# Patient Record
Sex: FEMALE | Race: BLACK OR AFRICAN AMERICAN | NOT HISPANIC OR LATINO | Employment: OTHER | URBAN - METROPOLITAN AREA
[De-identification: names, ages, dates, MRNs, and addresses within clinical notes are randomized per-mention and may not be internally consistent; named-entity substitution may affect disease eponyms.]

---

## 2018-03-07 ENCOUNTER — OFFICE VISIT (OUTPATIENT)
Dept: INTERNAL MEDICINE | Facility: CLINIC | Age: 78
End: 2018-03-07
Attending: FAMILY MEDICINE
Payer: MEDICARE

## 2018-03-07 ENCOUNTER — TELEPHONE (OUTPATIENT)
Dept: INTERNAL MEDICINE | Facility: CLINIC | Age: 78
End: 2018-03-07

## 2018-03-07 VITALS
BODY MASS INDEX: 40.04 KG/M2 | HEIGHT: 60 IN | SYSTOLIC BLOOD PRESSURE: 122 MMHG | WEIGHT: 203.94 LBS | OXYGEN SATURATION: 99 % | HEART RATE: 74 BPM | DIASTOLIC BLOOD PRESSURE: 70 MMHG

## 2018-03-07 DIAGNOSIS — H40.9 GLAUCOMA, UNSPECIFIED GLAUCOMA TYPE, UNSPECIFIED LATERALITY: ICD-10-CM

## 2018-03-07 DIAGNOSIS — E66.01 MORBID OBESITY: ICD-10-CM

## 2018-03-07 DIAGNOSIS — E53.8 B12 DEFICIENCY: ICD-10-CM

## 2018-03-07 DIAGNOSIS — F03.90 DEMENTIA WITHOUT BEHAVIORAL DISTURBANCE, UNSPECIFIED DEMENTIA TYPE: ICD-10-CM

## 2018-03-07 DIAGNOSIS — M81.0 OSTEOPOROSIS, UNSPECIFIED OSTEOPOROSIS TYPE, UNSPECIFIED PATHOLOGICAL FRACTURE PRESENCE: ICD-10-CM

## 2018-03-07 DIAGNOSIS — I50.43 ACUTE ON CHRONIC COMBINED SYSTOLIC AND DIASTOLIC HEART FAILURE: ICD-10-CM

## 2018-03-07 DIAGNOSIS — E78.5 HYPERLIPIDEMIA, UNSPECIFIED HYPERLIPIDEMIA TYPE: ICD-10-CM

## 2018-03-07 DIAGNOSIS — I34.0 MITRAL VALVE INSUFFICIENCY, UNSPECIFIED ETIOLOGY: Primary | ICD-10-CM

## 2018-03-07 DIAGNOSIS — G47.33 OSA (OBSTRUCTIVE SLEEP APNEA): ICD-10-CM

## 2018-03-07 DIAGNOSIS — E55.9 VITAMIN D DEFICIENCY: ICD-10-CM

## 2018-03-07 PROCEDURE — 99999 PR PBB SHADOW E&M-NEW PATIENT-LVL IV: CPT | Mod: PBBFAC,,, | Performed by: FAMILY MEDICINE

## 2018-03-07 PROCEDURE — 99203 OFFICE O/P NEW LOW 30 MIN: CPT | Mod: S$PBB,,, | Performed by: FAMILY MEDICINE

## 2018-03-07 PROCEDURE — 99204 OFFICE O/P NEW MOD 45 MIN: CPT | Mod: PBBFAC | Performed by: FAMILY MEDICINE

## 2018-03-07 RX ORDER — ENALAPRIL MALEATE 2.5 MG/1
2.5 TABLET ORAL DAILY
Qty: 90 TABLET | Refills: 3 | Status: SHIPPED | OUTPATIENT
Start: 2018-03-07 | End: 2019-02-19 | Stop reason: SDUPTHER

## 2018-03-07 RX ORDER — ERGOCALCIFEROL 1.25 MG/1
50000 CAPSULE ORAL
COMMUNITY
Start: 2018-02-22 | End: 2018-03-07 | Stop reason: SDUPTHER

## 2018-03-07 RX ORDER — DONEPEZIL HYDROCHLORIDE 5 MG/1
5 TABLET, FILM COATED ORAL DAILY
Qty: 90 TABLET | Refills: 3 | Status: SHIPPED | OUTPATIENT
Start: 2018-03-07 | End: 2019-02-19 | Stop reason: SDUPTHER

## 2018-03-07 RX ORDER — FUROSEMIDE 20 MG/1
TABLET ORAL
COMMUNITY
Start: 2018-02-22 | End: 2018-03-07 | Stop reason: SDUPTHER

## 2018-03-07 RX ORDER — DONEPEZIL HYDROCHLORIDE 5 MG/1
5 TABLET, FILM COATED ORAL
COMMUNITY
Start: 2018-02-22 | End: 2018-03-07 | Stop reason: SDUPTHER

## 2018-03-07 RX ORDER — ENALAPRIL MALEATE 2.5 MG/1
2.5 TABLET ORAL
COMMUNITY
Start: 2018-02-22 | End: 2018-03-07 | Stop reason: SDUPTHER

## 2018-03-07 RX ORDER — FUROSEMIDE 20 MG/1
20 TABLET ORAL 2 TIMES DAILY
Qty: 60 TABLET | Refills: 2 | Status: SHIPPED | OUTPATIENT
Start: 2018-03-07 | End: 2018-10-01 | Stop reason: SDUPTHER

## 2018-03-07 RX ORDER — ERGOCALCIFEROL 1.25 MG/1
50000 CAPSULE ORAL
Qty: 12 CAPSULE | Refills: 3 | Status: SHIPPED | OUTPATIENT
Start: 2018-03-07 | End: 2019-02-19 | Stop reason: SDUPTHER

## 2018-03-07 NOTE — PROGRESS NOTES
Subjective:      Patient ID: Cecelia Currie is a 77 y.o. female.    Chief Complaint: Establish Care    She typically gets her care in [place] and last pcp visit was 9 months ago. She did start take vitamin D medication. 2 weeks ago stopped all medication just because. She lives with her daughter. She reports her mood is good, no SI or HI.       Review of Systems   Constitutional: Negative.    HENT: Negative.    Eyes: Negative.    Cardiovascular: Negative.    Gastrointestinal: Negative.    Genitourinary: Negative.    Neurological: Negative.      I personally reviewed Past Medical History, Past Surgical history,  Past Social History and Family History    Objective:   /70   Pulse 74   Ht 5' (1.524 m)   Wt 92.5 kg (203 lb 14.8 oz)   SpO2 99%   BMI 39.83 kg/m²     Physical Exam   Constitutional: She is oriented to person, place, and time. She appears well-developed and well-nourished. No distress.   HENT:   Head: Normocephalic and atraumatic.   Right Ear: External ear normal.   Left Ear: External ear normal.   Mouth/Throat: Oropharynx is clear and moist.   Eyes: Conjunctivae and EOM are normal. Pupils are equal, round, and reactive to light. Right eye exhibits no discharge. Left eye exhibits no discharge. No scleral icterus.   Neck: Normal range of motion. Neck supple.   Cardiovascular: Normal rate, regular rhythm, normal heart sounds and intact distal pulses.  Exam reveals no gallop.    No murmur heard.  Pulmonary/Chest: Effort normal and breath sounds normal. No respiratory distress. She has no wheezes. She has no rales. She exhibits no tenderness.   Abdominal: Soft. Bowel sounds are normal. She exhibits no distension and no mass. There is no tenderness. There is no rebound and no guarding.   Musculoskeletal: Normal range of motion.   Neurological: She is alert and oriented to person, place, and time.   Skin: Skin is warm and dry.   Psychiatric: She has a normal mood and affect. Her behavior is  normal. Judgment and thought content normal.   Vitals reviewed.    4/2017 ECHO completed-all available for review under care everywhere tab   Echocardiogram done April 2017 reveals the following  Impressions   -----------      1.The estimated LV ejection fraction is 42 %      2.Increased left atrial pressure and Grade III diastolic dysfunction   is present      3.Left Atrium chamber is mildly dilated      4.There is severe mitral regurgitation      5.There is moderate tricuspid regurgitation      6.Estimated RVSP is 70.32 mmHg      7.Compared to the prior study dated 05/19/2014, the LVEF is slightly   lower            Cecelia was seen today for establish care.    Diagnoses and all orders for this visit:    Mitral valve insufficiency, unspecified etiology  - stable,     Acute on chronic combined systolic and diastolic heart failure  -     furosemide (LASIX) 20 MG tablet; Take 1 tablet (20 mg total) by mouth 2 (two) times daily, cont enalapril  -     COMPRESSION STOCKINGS    MADYSON (obstructive sleep apnea)  -stable on cpap      Morbid obesity  - discussed dietary changes     Vitamin D deficiency  -     Vitamin D; Future      Glaucoma, unspecified glaucoma type, unspecified laterality  -     Ambulatory consult to Ophthalmology    Osteoporosis, unspecified osteoporosis type, unspecified pathological fracture presence  Patient never on bisphosphonate, need to review chart to determine medication management   -upon chart review no medications started, handout given on bisphosphonates    Hyperlipidemia, unspecified hyperlipidemia type  - declined statin, aware of risk for stroke and heart attack   -will check lipids     B12 deficiency  -     Vitamin B12; Future      Dementia without behavioral disturbance, unspecified dementia type  -     Ambulatory consult to Neurology  -     donepezil (ARICEPT) 5 MG tablet; Take 1 tablet (5 mg total) by mouth once daily.

## 2018-03-07 NOTE — TELEPHONE ENCOUNTER
----- Message from Alethea Mann sent at 3/7/2018 10:00 AM CST -----  _  1st Request  _  2nd Request  _  3rd Request        Who: pt daughter    Why: Requesting a call back in regards to her 10 am appt they are running a little late but will be there before 20 min window  Please return the call at earliest convenience. Thanks!    What Number to Call Back:      When to Expect a call back: (Within 24 hours)    1:37 PM  Patient has been seen in clinic

## 2018-03-08 RX ORDER — ALENDRONATE SODIUM 70 MG/1
70 TABLET ORAL
Qty: 4 TABLET | Refills: 11 | Status: SHIPPED | OUTPATIENT
Start: 2018-03-08 | End: 2018-06-06 | Stop reason: SDUPTHER

## 2018-03-26 ENCOUNTER — OFFICE VISIT (OUTPATIENT)
Dept: OPTOMETRY | Facility: CLINIC | Age: 78
End: 2018-03-26
Payer: MEDICARE

## 2018-03-26 ENCOUNTER — CLINICAL SUPPORT (OUTPATIENT)
Dept: OPHTHALMOLOGY | Facility: CLINIC | Age: 78
End: 2018-03-26
Payer: MEDICARE

## 2018-03-26 DIAGNOSIS — H47.20 OPTIC NERVE ATROPHY, LEFT: ICD-10-CM

## 2018-03-26 DIAGNOSIS — H40.1112 PRIMARY OPEN-ANGLE GLAUCOMA, RIGHT EYE, MODERATE STAGE: Primary | ICD-10-CM

## 2018-03-26 DIAGNOSIS — H26.492 PCO (POSTERIOR CAPSULAR OPACIFICATION), LEFT: ICD-10-CM

## 2018-03-26 PROCEDURE — 92133 CPTRZD OPH DX IMG PST SGM ON: CPT | Mod: PBBFAC | Performed by: OPTOMETRIST

## 2018-03-26 PROCEDURE — 92004 COMPRE OPH EXAM NEW PT 1/>: CPT | Mod: S$PBB,,, | Performed by: OPTOMETRIST

## 2018-03-26 PROCEDURE — 92083 EXTENDED VISUAL FIELD XM: CPT | Mod: PBBFAC | Performed by: OPTOMETRIST

## 2018-03-26 PROCEDURE — 99999 PR PBB SHADOW E&M-EST. PATIENT-LVL II: CPT | Mod: PBBFAC,,, | Performed by: OPTOMETRIST

## 2018-03-26 PROCEDURE — 99212 OFFICE O/P EST SF 10 MIN: CPT | Mod: PBBFAC,25 | Performed by: OPTOMETRIST

## 2018-03-26 NOTE — PROGRESS NOTES
HPI     Patient's age: 77 y.o.      Approximate date of last eye examination:  April 2017  Name of last eye doctor seen: Darwin Pugh    Pt  Along with daughter states that she is here for general follow up of   the eyes,  Left eye be burning have glaucoma    Wears glasses? no      Headaches?  no  Eye pain/discomfort?  no                                                                                     Flashes?  no  Floaters?  sometime  Diplopia/Double vision?  sometime     ! OTC eyedrops currently using:  none   ! Prescription eye meds currently using:  Lantanoprost QHS - OU (last   over a week) care giver having difficulty         Last edited by Paul Price, OD on 3/26/2018  4:52 PM. (History)            Assessment /Plan     For exam results, see Encounter Report.    Primary open-angle glaucoma, right eye, moderate stage  -     OCT - Optic Nerve  -     Schaefer Visual Field - OU - Extended - Both Eyes  -Latanoprost QHS OU  -reviewed importance of compliance in monocular patient    Optic nerve atrophy, left  -No APD noted, but not checked by OD prior to dilation  -Pt appears blind in left eye.. Possible AION vs end stage GLC  -Latanoprost QHS    PCO (posterior capsular opacification), left  -not visual significant      RTC 6 mo IOP

## 2018-03-26 NOTE — LETTER
March 26, 2018      Margaret Rebolledo MD  [address]  [facility] [address]           [facility]  [address]  [facility] [address]  Phone: [phone]  Fax: [phone]          Patient: Cecelia Currie   MR Number: [id number]   YOB: 1940   Date of Visit: 3/26/2018       Dear Dr. Margaret Rebolledo:    Thank you for referring Cecelia Currie to me for evaluation. Attached you will find relevant portions of my assessment and plan of care.    If you have questions, please do not hesitate to call me. I look forward to following Cecelia Currie along with you.    Sincerely,    Paul Price, OD    Enclosure  CC:  No Recipients    If you would like to receive this communication electronically, please contact [email or web address] or [phone] to request more information on [facility] access.    For providers and/or their staff who would like to refer a patient to [facility], please contact us through our one-stop-shop provider referral line, [facility], at [phone].    If you feel you have received this communication in error or would no longer like to receive these types of communications, please e-mail [email or web address]

## 2018-04-02 ENCOUNTER — LAB VISIT (OUTPATIENT)
Dept: LAB | Facility: OTHER | Age: 78
End: 2018-04-02
Attending: FAMILY MEDICINE
Payer: MEDICARE

## 2018-04-02 ENCOUNTER — OFFICE VISIT (OUTPATIENT)
Dept: NEUROLOGY | Facility: CLINIC | Age: 78
End: 2018-04-02
Attending: FAMILY MEDICINE
Payer: MEDICARE

## 2018-04-02 ENCOUNTER — TELEPHONE (OUTPATIENT)
Dept: INTERNAL MEDICINE | Facility: CLINIC | Age: 78
End: 2018-04-02

## 2018-04-02 VITALS
SYSTOLIC BLOOD PRESSURE: 110 MMHG | HEART RATE: 63 BPM | WEIGHT: 194.25 LBS | HEIGHT: 60 IN | DIASTOLIC BLOOD PRESSURE: 64 MMHG | BODY MASS INDEX: 38.14 KG/M2

## 2018-04-02 DIAGNOSIS — F01.50 VASCULAR DEMENTIA WITHOUT BEHAVIORAL DISTURBANCE: Primary | ICD-10-CM

## 2018-04-02 DIAGNOSIS — E55.9 VITAMIN D DEFICIENCY: ICD-10-CM

## 2018-04-02 DIAGNOSIS — G47.33 OSA (OBSTRUCTIVE SLEEP APNEA): ICD-10-CM

## 2018-04-02 DIAGNOSIS — H40.9 GLAUCOMA, UNSPECIFIED GLAUCOMA TYPE, UNSPECIFIED LATERALITY: ICD-10-CM

## 2018-04-02 DIAGNOSIS — M81.0 OSTEOPOROSIS, UNSPECIFIED OSTEOPOROSIS TYPE, UNSPECIFIED PATHOLOGICAL FRACTURE PRESENCE: ICD-10-CM

## 2018-04-02 DIAGNOSIS — F03.90 DEMENTIA WITHOUT BEHAVIORAL DISTURBANCE, UNSPECIFIED DEMENTIA TYPE: ICD-10-CM

## 2018-04-02 DIAGNOSIS — E78.5 HYPERLIPIDEMIA, UNSPECIFIED HYPERLIPIDEMIA TYPE: ICD-10-CM

## 2018-04-02 DIAGNOSIS — I34.0 MITRAL VALVE INSUFFICIENCY, UNSPECIFIED ETIOLOGY: ICD-10-CM

## 2018-04-02 DIAGNOSIS — I35.0 AORTIC VALVE STENOSIS, ETIOLOGY OF CARDIAC VALVE DISEASE UNSPECIFIED: ICD-10-CM

## 2018-04-02 DIAGNOSIS — I50.43 ACUTE ON CHRONIC COMBINED SYSTOLIC AND DIASTOLIC HEART FAILURE: ICD-10-CM

## 2018-04-02 DIAGNOSIS — E66.01 MORBID OBESITY: ICD-10-CM

## 2018-04-02 DIAGNOSIS — E53.8 B12 DEFICIENCY: ICD-10-CM

## 2018-04-02 DIAGNOSIS — R41.3 MEMORY LOSS: ICD-10-CM

## 2018-04-02 LAB
25(OH)D3+25(OH)D2 SERPL-MCNC: 26 NG/ML
ALBUMIN SERPL BCP-MCNC: 2.8 G/DL
ALP SERPL-CCNC: 112 U/L
ALT SERPL W/O P-5'-P-CCNC: 15 U/L
ANION GAP SERPL CALC-SCNC: 12 MMOL/L
AST SERPL-CCNC: 27 U/L
BASOPHILS # BLD AUTO: 0.08 K/UL
BASOPHILS NFR BLD: 1.2 %
BILIRUB SERPL-MCNC: 1.1 MG/DL
BUN SERPL-MCNC: 13 MG/DL
CALCIUM SERPL-MCNC: 8.7 MG/DL
CHLORIDE SERPL-SCNC: 108 MMOL/L
CHOLEST SERPL-MCNC: 144 MG/DL
CHOLEST/HDLC SERPL: 3.8 {RATIO}
CO2 SERPL-SCNC: 19 MMOL/L
CREAT SERPL-MCNC: 0.9 MG/DL
DIFFERENTIAL METHOD: ABNORMAL
EOSINOPHIL # BLD AUTO: 0.2 K/UL
EOSINOPHIL NFR BLD: 3.3 %
ERYTHROCYTE [DISTWIDTH] IN BLOOD BY AUTOMATED COUNT: 15.2 %
EST. GFR  (AFRICAN AMERICAN): >60 ML/MIN/1.73 M^2
EST. GFR  (NON AFRICAN AMERICAN): >60 ML/MIN/1.73 M^2
GLUCOSE SERPL-MCNC: 86 MG/DL
HCT VFR BLD AUTO: 46.7 %
HDLC SERPL-MCNC: 38 MG/DL
HDLC SERPL: 26.4 %
HGB BLD-MCNC: 15 G/DL
LDLC SERPL CALC-MCNC: 89.8 MG/DL
LYMPHOCYTES # BLD AUTO: 1.8 K/UL
LYMPHOCYTES NFR BLD: 26.7 %
MCH RBC QN AUTO: 28.6 PG
MCHC RBC AUTO-ENTMCNC: 32.1 G/DL
MCV RBC AUTO: 89 FL
MONOCYTES # BLD AUTO: 0.9 K/UL
MONOCYTES NFR BLD: 13 %
NEUTROPHILS # BLD AUTO: 3.8 K/UL
NEUTROPHILS NFR BLD: 55.7 %
NONHDLC SERPL-MCNC: 106 MG/DL
PLATELET # BLD AUTO: 266 K/UL
PMV BLD AUTO: 10.1 FL
POTASSIUM SERPL-SCNC: 4.1 MMOL/L
PROT SERPL-MCNC: 7.1 G/DL
RBC # BLD AUTO: 5.25 M/UL
SODIUM SERPL-SCNC: 139 MMOL/L
TRIGL SERPL-MCNC: 81 MG/DL
TSH SERPL DL<=0.005 MIU/L-ACNC: 0.24 UIU/ML
VIT B12 SERPL-MCNC: 527 PG/ML
WBC # BLD AUTO: 6.75 K/UL

## 2018-04-02 PROCEDURE — 99204 OFFICE O/P NEW MOD 45 MIN: CPT | Mod: S$PBB,25,, | Performed by: PSYCHIATRY & NEUROLOGY

## 2018-04-02 PROCEDURE — 99999 PR PBB SHADOW E&M-EST. PATIENT-LVL III: CPT | Mod: PBBFAC,,, | Performed by: PSYCHIATRY & NEUROLOGY

## 2018-04-02 PROCEDURE — 80053 COMPREHEN METABOLIC PANEL: CPT

## 2018-04-02 PROCEDURE — 36415 COLL VENOUS BLD VENIPUNCTURE: CPT

## 2018-04-02 PROCEDURE — 84443 ASSAY THYROID STIM HORMONE: CPT

## 2018-04-02 PROCEDURE — 80061 LIPID PANEL: CPT

## 2018-04-02 PROCEDURE — 99213 OFFICE O/P EST LOW 20 MIN: CPT | Mod: PBBFAC | Performed by: PSYCHIATRY & NEUROLOGY

## 2018-04-02 PROCEDURE — 82607 VITAMIN B-12: CPT

## 2018-04-02 PROCEDURE — 85025 COMPLETE CBC W/AUTO DIFF WBC: CPT

## 2018-04-02 PROCEDURE — 82306 VITAMIN D 25 HYDROXY: CPT

## 2018-04-02 NOTE — TELEPHONE ENCOUNTER
----- Message from Hugo Alonso sent at 4/2/2018  2:41 PM CDT -----  Contact: Jennifer from Inpatient Lab        Name of Who is Calling: Jennifer from Inpatient Lab      What is the request in detail: Patient's free T-4 blood work sample ran short on the machine and may need a new sample      Can the clinic reply by [facility]: [phone]      What Number to Call Back [facility] not [facility]:

## 2018-04-02 NOTE — PROGRESS NOTES
Subjective:       Patient ID: Cecelia Currie is a 77 y.o. female.    Chief Complaint:  Memory Loss      History of Present Illness  HPI   This is a 77-year-old female who was referred for evaluation of memory difficulties.  She was accompanied by her daughter was the primary historian.  The patient used to live in [facility] on her own with her son occasionally checking on her off-and-on.  Over the past year it is noted that she was having increasing difficulty with recall of recent information, getting confused very easily and subsequently started accusing her son of stealing her money.  She stopped driving several years ago as she was confused with directions.  The family was taking care of the finances, the meals and managing the medications.  She then moved in with her daughter who lives in [place] and is better able to manage her care.  Her primary problem at this time is that of recalling of recent information.  She is very forgetful and gets confused very easily.  She has occasionally talking about her friends however they have been  and she cannot remember this.  She has a history of sleep apnea however is no longer on CPAP.  The daughter denies excessive sleepiness in the daytime.  Her behavior has been stable.  Medical records from [place] are available for review in Care Everywhere.    She was seen by her primary care physician in 2018 he get she has been on Aricept 5 mg daily at bedtime, prescribed by her physician in [place], however since then she has been having increasing dreams and talking in her sleep.  She has no history of blackouts or seizures and no history of head trauma.  She denies any hearing impairment she has mild visual difficulties related to glaucoma.  Vascular risk factors include history of heart failure and aortic stenosis.  She also has a history of obstructive sleep apnea.  She is scheduled to have blood work done today.       Review of  Systems  Review of Systems   Constitutional: Negative.    HENT: Negative.  Negative for hearing loss.    Eyes: Negative.  Negative for visual disturbance.   Respiratory: Positive for apnea (History of sleep apnea). Negative for shortness of breath.    Cardiovascular: Negative.  Negative for chest pain and palpitations.   Gastrointestinal: Negative.    Genitourinary: Negative.    Musculoskeletal: Positive for arthralgias. Negative for back pain, gait problem and neck pain.   Skin: Negative.    Neurological: Negative.  Negative for tremors, seizures, syncope, speech difficulty, weakness, numbness and headaches.   Psychiatric/Behavioral: Positive for confusion and decreased concentration.       Objective:      Neurologic Exam     Mental Status   Disoriented to person.   Disoriented to place.   Disoriented to year, month, date and day. Oriented to season.   Registration: recalls 3 of 3 objects. Recall at 5 minutes: recalls 1 of 3 objects. Follows 2 step commands.   Attention: decreased. Concentration: decreased.   Speech: speech is normal   Level of consciousness: alert  Knowledge: good and consistent with education. Unable to perform simple calculations.   Able to name object. Able to read. Able to repeat. Able to write. Normal comprehension.   Spelling backwards 2/5.  Cannot do calculations.    The patient cannot give any adequate recent history.     Cranial Nerves   Cranial nerves II through XII intact.     Motor Exam   Muscle bulk: normal  Overall muscle tone: normal    Strength   Strength 5/5 throughout.     Sensory Exam   Light touch normal.   Proprioception normal.   Pinprick normal.     Gait, Coordination, and Reflexes     Gait  Gait: non-neurologic (Walks slowly and a little stiffly)    Coordination   Positive Romberg's test: Equivocal.  Finger to nose coordination: normal    Tremor   Resting tremor: absent  Intention tremor: absent  Action tremor: absent    Reflexes   Right brachioradialis: 1+  Left  brachioradialis: 1+  Right biceps: 1+  Left biceps: 1+  Right triceps: 1+  Left triceps: 1+  Right patellar: 1+  Left patellar: 1+  Right achilles: 0  Left achilles: 0  Right plantar: normal  Left plantar: normal      Physical Exam   Constitutional: She appears well-developed and well-nourished.   HENT:   Head: Normocephalic and atraumatic.   Neck: Normal range of motion. Neck supple. Carotid bruit is not present.   Neurological: She has normal strength. She has a normal Finger-Nose-Finger Test. Positive Romberg's test: Equivocal.   Reflex Scores:       Tricep reflexes are 1+ on the right side and 1+ on the left side.       Bicep reflexes are 1+ on the right side and 1+ on the left side.       Brachioradialis reflexes are 1+ on the right side and 1+ on the left side.       Patellar reflexes are 1+ on the right side and 1+ on the left side.       Achilles reflexes are 0 on the right side and 0 on the left side.  Psychiatric: Her speech is normal.   Vitals reviewed.        Assessment:        1. Vascular dementia without behavioral disturbance    2. Memory loss    3. MADYSON (obstructive sleep apnea)    4. Acute on chronic combined systolic and diastolic heart failure    5. Aortic valve stenosis, etiology of cardiac valve disease unspecified            Plan:       Discussed with patient and daughter.  The patient has an early dementia most likely vascular in type with the possibility of Alzheimer's in addition needs to be kept in mind.  She is also advised to switch the Aricept from evening to morning with food.  She is going to get blood work done today and a CT scan of the brain, noncontrast is scheduled. Reviewed immunization history.  Patient counseled about getting her immunization shots and is given a prescription for this.  The patient will follow-up after workup is completed.

## 2018-04-02 NOTE — PATIENT INSTRUCTIONS
Reviewed immunization history.  Patient counseled about getting her immunization shots and is given a prescription for this.  Advised patient to take Aricept 5 mg daily in the morning.  We'll get a CT scan of the brain.

## 2018-04-02 NOTE — TELEPHONE ENCOUNTER
Notified pt and scheduled appt to retake lab. Pt understood and verbalized and had no further questions or concerns.

## 2018-04-04 ENCOUNTER — HOSPITAL ENCOUNTER (OUTPATIENT)
Dept: RADIOLOGY | Facility: OTHER | Age: 78
Discharge: HOME | End: 2018-04-04
Attending: PSYCHIATRY & NEUROLOGY
Payer: MEDICARE

## 2018-04-04 DIAGNOSIS — E03.9 HYPOTHYROIDISM, UNSPECIFIED TYPE: Primary | ICD-10-CM

## 2018-04-04 DIAGNOSIS — R41.3 MEMORY LOSS: ICD-10-CM

## 2018-04-04 PROCEDURE — 70450 CT HEAD/BRAIN W/O DYE: CPT | Mod: 26,,, | Performed by: RADIOLOGY

## 2018-04-04 PROCEDURE — 70450 CT HEAD/BRAIN W/O DYE: CPT | Mod: TC

## 2018-04-05 ENCOUNTER — TELEPHONE (OUTPATIENT)
Dept: INTERNAL MEDICINE | Facility: CLINIC | Age: 78
End: 2018-04-05

## 2018-04-05 DIAGNOSIS — E05.90 HYPERTHYROIDISM: Primary | ICD-10-CM

## 2018-04-05 NOTE — TELEPHONE ENCOUNTER
Call to patient and daughter to notify of lab order received. Appointment for labs scheduled tomorrow and lab order linked to the appointment.  Edie Willingham RN

## 2018-04-06 ENCOUNTER — LAB VISIT (OUTPATIENT)
Dept: LAB | Facility: OTHER | Age: 78
End: 2018-04-06
Attending: FAMILY MEDICINE
Payer: MEDICARE

## 2018-04-06 DIAGNOSIS — E05.90 HYPERTHYROIDISM: ICD-10-CM

## 2018-04-06 LAB — T4 FREE SERPL-MCNC: 1.09 NG/DL

## 2018-04-06 PROCEDURE — 84439 ASSAY OF FREE THYROXINE: CPT

## 2018-04-06 PROCEDURE — 36415 COLL VENOUS BLD VENIPUNCTURE: CPT

## 2018-04-09 ENCOUNTER — PATIENT MESSAGE (OUTPATIENT)
Dept: INTERNAL MEDICINE | Facility: CLINIC | Age: 78
End: 2018-04-09

## 2018-04-09 DIAGNOSIS — E05.90 SUBCLINICAL HYPERTHYROIDISM: Primary | ICD-10-CM

## 2018-04-09 NOTE — TELEPHONE ENCOUNTER
You are at elevated risk for stroke and heart attack please let us know if you would like to start a medication such as lipitor to reduce this risk    Thyroid studies are abnormal and we will repeat in 8 weeks

## 2018-04-24 DIAGNOSIS — H40.1192 PRIMARY OPEN-ANGLE GLAUCOMA, MODERATE STAGE, UNSPECIFIED LATERALITY: Primary | ICD-10-CM

## 2018-04-25 RX ORDER — LATANOPROST 50 UG/ML
SOLUTION/ DROPS OPHTHALMIC
Qty: 2.5 ML | Refills: 0 | Status: SHIPPED | OUTPATIENT
Start: 2018-04-25 | End: 2020-01-01

## 2018-04-25 RX ORDER — CYANOCOBALAMIN 1000 UG/ML
INJECTION, SOLUTION INTRAMUSCULAR; SUBCUTANEOUS
Refills: 3 | COMMUNITY
Start: 2018-04-03 | End: 2019-02-19 | Stop reason: SDUPTHER

## 2018-05-22 ENCOUNTER — PATIENT MESSAGE (OUTPATIENT)
Dept: ADMINISTRATIVE | Facility: HOSPITAL | Age: 78
End: 2018-05-22

## 2018-05-29 ENCOUNTER — TELEPHONE (OUTPATIENT)
Dept: OPTOMETRY | Facility: CLINIC | Age: 78
End: 2018-05-29

## 2018-06-06 ENCOUNTER — LAB VISIT (OUTPATIENT)
Dept: LAB | Facility: OTHER | Age: 78
End: 2018-06-06
Attending: FAMILY MEDICINE
Payer: MEDICARE

## 2018-06-06 ENCOUNTER — OFFICE VISIT (OUTPATIENT)
Dept: INTERNAL MEDICINE | Facility: CLINIC | Age: 78
End: 2018-06-06
Attending: FAMILY MEDICINE
Payer: MEDICARE

## 2018-06-06 VITALS
SYSTOLIC BLOOD PRESSURE: 132 MMHG | DIASTOLIC BLOOD PRESSURE: 74 MMHG | BODY MASS INDEX: 37.95 KG/M2 | HEART RATE: 77 BPM | WEIGHT: 193.31 LBS | HEIGHT: 60 IN | OXYGEN SATURATION: 96 %

## 2018-06-06 DIAGNOSIS — R05.3 PERSISTENT COUGH: ICD-10-CM

## 2018-06-06 DIAGNOSIS — E05.90 SUBCLINICAL HYPERTHYROIDISM: ICD-10-CM

## 2018-06-06 DIAGNOSIS — H40.9 GLAUCOMA, UNSPECIFIED GLAUCOMA TYPE, UNSPECIFIED LATERALITY: ICD-10-CM

## 2018-06-06 DIAGNOSIS — G47.33 OSA (OBSTRUCTIVE SLEEP APNEA): ICD-10-CM

## 2018-06-06 DIAGNOSIS — I50.43 ACUTE ON CHRONIC COMBINED SYSTOLIC AND DIASTOLIC HEART FAILURE: ICD-10-CM

## 2018-06-06 DIAGNOSIS — I35.0 AORTIC VALVE STENOSIS, ETIOLOGY OF CARDIAC VALVE DISEASE UNSPECIFIED: ICD-10-CM

## 2018-06-06 DIAGNOSIS — E66.01 SEVERE OBESITY: ICD-10-CM

## 2018-06-06 DIAGNOSIS — R19.8 PROTUBERANT ABDOMEN: Primary | ICD-10-CM

## 2018-06-06 DIAGNOSIS — M81.0 OSTEOPOROSIS, UNSPECIFIED OSTEOPOROSIS TYPE, UNSPECIFIED PATHOLOGICAL FRACTURE PRESENCE: ICD-10-CM

## 2018-06-06 LAB
T4 FREE SERPL-MCNC: 1.21 NG/DL
TSH SERPL DL<=0.005 MIU/L-ACNC: 2.02 UIU/ML

## 2018-06-06 PROCEDURE — 99214 OFFICE O/P EST MOD 30 MIN: CPT | Mod: S$PBB,,, | Performed by: FAMILY MEDICINE

## 2018-06-06 PROCEDURE — 84443 ASSAY THYROID STIM HORMONE: CPT

## 2018-06-06 PROCEDURE — 99999 PR PBB SHADOW E&M-EST. PATIENT-LVL IV: CPT | Mod: PBBFAC,,, | Performed by: FAMILY MEDICINE

## 2018-06-06 PROCEDURE — 36415 COLL VENOUS BLD VENIPUNCTURE: CPT

## 2018-06-06 PROCEDURE — 84439 ASSAY OF FREE THYROXINE: CPT

## 2018-06-06 PROCEDURE — 99214 OFFICE O/P EST MOD 30 MIN: CPT | Mod: PBBFAC | Performed by: FAMILY MEDICINE

## 2018-06-06 RX ORDER — ALENDRONATE SODIUM 70 MG/1
70 TABLET ORAL
Qty: 4 TABLET | Refills: 11 | Status: SHIPPED | OUTPATIENT
Start: 2018-06-06 | End: 2019-02-19 | Stop reason: SDUPTHER

## 2018-06-06 NOTE — PROGRESS NOTES
Subjective:      Patient ID: Cecelia Currie is a 77 y.o. female.    Chief Complaint: Follow-up    One month of dry cough, it may be worse with laying down, she denies wheezing or sob with it. She denies reflux or allergy symptoms. She denies fevers, night sweats or easy bruising.       Review of Systems   Constitutional: Negative.    HENT: Negative.    Eyes: Negative.    Respiratory: Positive for cough.    Cardiovascular: Negative.    Gastrointestinal: Negative.    Genitourinary: Negative.      I personally reviewed Past Medical History, Past Surgical history,  Past Social History and Family History    Objective:   /74   Pulse 77   Ht 5' (1.524 m)   Wt 87.7 kg (193 lb 5.5 oz)   SpO2 96%   BMI 37.76 kg/m²     Physical Exam   Constitutional: She is oriented to person, place, and time. She appears well-developed and well-nourished. No distress.   HENT:   Head: Normocephalic.   Right Ear: External ear normal.   Left Ear: External ear normal.   Mouth/Throat: Oropharynx is clear and moist.   Eyes: Conjunctivae and EOM are normal. Pupils are equal, round, and reactive to light. Right eye exhibits no discharge. Left eye exhibits no discharge. No scleral icterus.   Neck: Normal range of motion. No tracheal deviation present. No thyromegaly present.   Cardiovascular: Normal rate, regular rhythm, normal heart sounds and intact distal pulses.  Exam reveals no gallop.    No murmur heard.  Pulmonary/Chest: Effort normal and breath sounds normal. No respiratory distress. She has no wheezes. She has no rales. She exhibits no tenderness.   Abdominal: Soft. Bowel sounds are normal. She exhibits no distension and no mass. There is no tenderness. There is no rebound and no guarding.   Musculoskeletal: Normal range of motion.   Neurological: She is alert and oriented to person, place, and time.   Skin: Skin is warm and dry.   Psychiatric: She has a normal mood and affect. Her behavior is normal. Judgment and thought  content normal.   Vitals reviewed.      Cecelia was seen today for follow-up.    Diagnoses and all orders for this visit:    Protuberant abdomen  Severe obesity  -     US Abdomen Complete; Future  -     Ambulatory referral to Home Health    Osteoporosis, unspecified osteoporosis type, unspecified pathological fracture presence  -     alendronate (FOSAMAX) 70 MG tablet; Take 1 tablet (70 mg total) by mouth every 7 days.    Glaucoma, unspecified glaucoma type, unspecified laterality  -stable on current regimen, followed by optometry     Aortic valve stenosis, etiology of cardiac valve disease unspecified  Acute on chronic combined systolic and diastolic heart failure  MADYSON (obstructive sleep apnea)  -stable on current regimen  -     Ambulatory referral to Home Health    Cough  -discussed changing enalapril, cxr, echo and pfts  -patient has declined any further work up and will call if she changes her mind

## 2018-06-06 NOTE — PATIENT INSTRUCTIONS
Osteoporosis Medications: Bisphosphonates  Depending on your needs, your healthcare provider may prescribe medicines to prevent or treat osteoporosis.    Bisphosphonates  Several medicines make up the class of drugs known as bisphosphonates. Bisphosphonates are the most common type of medicine used to help prevent and treat bone loss. Bisphosphonates are given in pill or injectable form as an IV infusion. They must be taken exactly as directed. Benefits may include:  · Reducing bone loss  · Increasing bone density in the hip and spine  · Reducing risk of fractures in the spine, hip, and wrist  Side effects may include:  · Heartburn  · Nausea  · Abdominal pain  · Bone or muscle pain  Taking bisphosphonates pills  Always read medicine information closely. Certain bisphosphonates must be taken:  · On an empty stomach.  · With a full glass of water (8 oz) first thing in the morning.  · At least 30 minutes to one hour before any food, drink, or other medications.  · While sitting or standing. You should not lie down for at least 30 minutes after taking the medicine.  Newer medicines can be taken weekly or monthly. Talk with your doctor to find out which one is right for you.   Date Last Reviewed: 10/11/2015  © 9509-1618 [employer]. [address] [place] [address], [place] [address]. All rights reserved. This information is not intended as a substitute for professional medical care. Always follow your healthcare professional's instructions.

## 2018-06-08 ENCOUNTER — HOSPITAL ENCOUNTER (OUTPATIENT)
Dept: CARDIOLOGY | Facility: CLINIC | Age: 78
Discharge: HOME | End: 2018-06-08
Payer: MEDICARE

## 2018-06-08 ENCOUNTER — OFFICE VISIT (OUTPATIENT)
Dept: CARDIOLOGY | Facility: CLINIC | Age: 78
End: 2018-06-08
Payer: MEDICARE

## 2018-06-08 ENCOUNTER — HOSPITAL ENCOUNTER (OUTPATIENT)
Dept: RADIOLOGY | Facility: OTHER | Age: 78
Discharge: HOME | End: 2018-06-08
Attending: FAMILY MEDICINE
Payer: MEDICARE

## 2018-06-08 VITALS
HEART RATE: 55 BPM | WEIGHT: 190.5 LBS | BODY MASS INDEX: 37.2 KG/M2 | SYSTOLIC BLOOD PRESSURE: 116 MMHG | DIASTOLIC BLOOD PRESSURE: 50 MMHG

## 2018-06-08 DIAGNOSIS — H40.9 GLAUCOMA, UNSPECIFIED GLAUCOMA TYPE, UNSPECIFIED LATERALITY: ICD-10-CM

## 2018-06-08 DIAGNOSIS — R19.8 PROTUBERANT ABDOMEN: ICD-10-CM

## 2018-06-08 DIAGNOSIS — R41.3 MEMORY LOSS: ICD-10-CM

## 2018-06-08 DIAGNOSIS — E66.01 SEVERE OBESITY: ICD-10-CM

## 2018-06-08 DIAGNOSIS — I10 ESSENTIAL HYPERTENSION: ICD-10-CM

## 2018-06-08 DIAGNOSIS — I50.9 CONGESTIVE HEART FAILURE, UNSPECIFIED HF CHRONICITY, UNSPECIFIED HEART FAILURE TYPE: ICD-10-CM

## 2018-06-08 DIAGNOSIS — M81.0 OSTEOPOROSIS, UNSPECIFIED OSTEOPOROSIS TYPE, UNSPECIFIED PATHOLOGICAL FRACTURE PRESENCE: ICD-10-CM

## 2018-06-08 DIAGNOSIS — I50.43 ACUTE ON CHRONIC COMBINED SYSTOLIC AND DIASTOLIC HEART FAILURE: ICD-10-CM

## 2018-06-08 DIAGNOSIS — G47.33 OSA (OBSTRUCTIVE SLEEP APNEA): ICD-10-CM

## 2018-06-08 DIAGNOSIS — I50.82 BIVENTRICULAR HEART FAILURE: ICD-10-CM

## 2018-06-08 DIAGNOSIS — I34.0 NON-RHEUMATIC MITRAL REGURGITATION: Primary | ICD-10-CM

## 2018-06-08 DIAGNOSIS — I35.0 AORTIC VALVE STENOSIS, ETIOLOGY OF CARDIAC VALVE DISEASE UNSPECIFIED: ICD-10-CM

## 2018-06-08 DIAGNOSIS — I50.9 CONGESTIVE HEART FAILURE, UNSPECIFIED HF CHRONICITY, UNSPECIFIED HEART FAILURE TYPE: Primary | ICD-10-CM

## 2018-06-08 PROCEDURE — 99213 OFFICE O/P EST LOW 20 MIN: CPT | Mod: PBBFAC,25 | Performed by: INTERNAL MEDICINE

## 2018-06-08 PROCEDURE — 93005 ELECTROCARDIOGRAM TRACING: CPT | Mod: PBBFAC | Performed by: INTERNAL MEDICINE

## 2018-06-08 PROCEDURE — 76700 US EXAM ABDOM COMPLETE: CPT | Mod: 26,,, | Performed by: INTERNAL MEDICINE

## 2018-06-08 PROCEDURE — 99999 PR PBB SHADOW E&M-EST. PATIENT-LVL III: CPT | Mod: PBBFAC,GC,, | Performed by: INTERNAL MEDICINE

## 2018-06-08 PROCEDURE — 76700 US EXAM ABDOM COMPLETE: CPT | Mod: TC

## 2018-06-08 PROCEDURE — 99214 OFFICE O/P EST MOD 30 MIN: CPT | Mod: S$PBB,GC,, | Performed by: INTERNAL MEDICINE

## 2018-06-08 PROCEDURE — 93010 ELECTROCARDIOGRAM REPORT: CPT | Mod: S$PBB,,, | Performed by: INTERNAL MEDICINE

## 2018-06-08 NOTE — PROGRESS NOTES
Subjective:       Patient ID: Cecelia Currie is a 77 y.o. female.    Chief Complaint: Acute on chronic combined systolic and diastolic heart failu; Sleep Apnea; and Valvular Heart Disease    HPI     Ms. Currie is a 77 year old female with a past medical history of severe MR, PHTN, HTN, breast cancer 1985 s/p chemo, XRT and mastectomy, MADYSON who presents [facility] care. She received most of her care from [facility] where she was noted to have severe MR. From the records, it appears that she was evaluated for valve replacement as far back as 2014. She had borderline EF of 40-45%, and received a SANDI with LHC/RHC. I was not able to find the results of the SANDI, but it stated that she had severe MR and moderate-severe TR. Her LHC/RHC  found a subtotal LAD and moderate leasion in her RCA with adequate targets for bypass. RHC numbers could not be found. She was eventually sent for surgical referral, and the notes state that she was denied due to debility and the pulmonary hypertension. She was then referred to interventional cardiology to receive PCI to LAD lesion, however, she did not receive this stent as she did not understand the procedure. She never followed up afterwards. On discussion with the patient and her daughter, who was present during consultation, she was offered the surgery, but the patient refused to receive open heart surgery.    The patient herself was not able to recount any history of her heart disease. She states that she is doing fine and is able to do everything that she wants to do without any problems. Her daughter states that she is only able to walk around the house, and upon leaving the house, she needs a wheelchair. She has not been up the stairs in the house in over 6 months and when she does, she gets chest pressure after 1 flight of stairs. She has been having a lot of fluid in her legs and her abdomen and she had about 2 weeks of increased lasix dose to 20 BID instead of  20 QD. She is only on enalapril 2.5 QD and lasix. She is already bradycardic at baseline and is thus unable to tolerate beta blocker. She was given aldactone at one point, but developed severe hyperkalemia up to 8mEq.    The daughter notes that she has had poor long term and short term memory for the past year. For the past 3 months, she's been on aricept with no improvement. Reportedly, she has also been having hallucinations as well.    Review of Systems   Constitutional: Negative for activity change, chills, fatigue and fever.   HENT: Negative.    Respiratory: Negative for cough and shortness of breath.    Cardiovascular: Negative for chest pain, palpitations and leg swelling.   Gastrointestinal: Negative for abdominal distention, diarrhea, nausea and vomiting.   Genitourinary: Negative for difficulty urinating, dysuria and urgency.   Musculoskeletal: Negative for arthralgias and myalgias.   Skin: Negative for color change and rash.   Neurological: Negative for dizziness, weakness, light-headedness and numbness.   Psychiatric/Behavioral: Positive for confusion and hallucinations.       Objective:     Vitals:    06/08/18 1046   BP: (!) 116/50   BP Location: Left arm   Patient Position: Sitting   BP Method: X-Large (Automatic)   Pulse: (!) 55   Weight: 86.4 kg (190 lb 7.6 oz)       Physical Exam   Constitutional: Vital signs are normal. She appears well-developed and well-nourished. She is active and cooperative.  Non-toxic appearance. No distress.   Patient has a lot of muscular wasting.   HENT:   Head: Normocephalic and atraumatic.   Mouth/Throat: Uvula is midline, oropharynx is clear and moist and mucous membranes are normal.   Eyes: Conjunctivae and lids are normal. Pupils are equal, round, and reactive to light.   Neck: Normal carotid pulses and no JVD present. Carotid bruit is not present.   Cardiovascular: Normal rate, regular rhythm, S1 normal, S2 normal, normal heart sounds and normal pulses.  Exam reveals  no gallop.    No murmur heard.  Pulses:       Radial pulses are 2+ on the right side, and 2+ on the left side.        Dorsalis pedis pulses are 2+ on the right side, and 2+ on the left side.        Posterior tibial pulses are 2+ on the right side, and 2+ on the left side.   Tense LE edema up to lower abdomen.   Pulmonary/Chest: Effort normal and breath sounds normal. No accessory muscle usage. No respiratory distress. She has no decreased breath sounds. She has no wheezes. She has no rhonchi. She has no rales.   Abdominal: Soft. Bowel sounds are normal. She exhibits no distension and no mass. There is no tenderness.   Neurological: She is alert.   Skin: Skin is warm, dry and intact.       Assessment:       1. Non-rheumatic mitral regurgitation    2. Biventricular heart failure    3. Essential hypertension    4. Memory loss        Plan:     Biventricular heart failure  --likely biventricular heart failure at this point as a result of the severe MR  --likely will need increased dose of lasix. Will get some blood work today to see how her renal function is.  --would like to increase enalapril as well, but will wait on 2D echo    Essential hypertension  --well controlled  --continue to monitor    Memory loss  --with hallucinations as well  --will attempt to address code status with the next visit    Non-rheumatic mitral regurgitation  --likely past the point where she would benefit from surgery  --2D echo pending      Dee cMkinley MD  Cardiology PGY5

## 2018-06-08 NOTE — ASSESSMENT & PLAN NOTE
--likely biventricular heart failure at this point as a result of the severe MR  --likely will need increased dose of lasix. Will get some blood work today to see how her renal function is.  --would like to increase enalapril as well, but will wait on 2D echo

## 2018-06-08 NOTE — PROGRESS NOTES
I have personally taken the history and examined this patient and agree with the Fellow's note as stated above.    Her daughter is clear that all she wants is comfort care, and the pt is very clear that she does not want any surgery.

## 2018-06-18 ENCOUNTER — HOSPITAL ENCOUNTER (OUTPATIENT)
Dept: CARDIOLOGY | Facility: OTHER | Age: 78
Discharge: HOME | End: 2018-06-18
Attending: INTERNAL MEDICINE
Payer: MEDICARE

## 2018-06-18 DIAGNOSIS — I34.0 NON-RHEUMATIC MITRAL REGURGITATION: ICD-10-CM

## 2018-06-18 LAB
ESTIMATED PA SYSTOLIC PRESSURE: 49.22
GLOBAL PERICARDIAL EFFUSION: ABNORMAL
MITRAL VALVE REGURGITATION: ABNORMAL
RETIRED EF AND QEF - SEE NOTES: 25 (ref 55–65)
TRICUSPID VALVE REGURGITATION: ABNORMAL

## 2018-06-18 PROCEDURE — 93306 TTE W/DOPPLER COMPLETE: CPT

## 2018-06-22 ENCOUNTER — TELEPHONE (OUTPATIENT)
Dept: CARDIOLOGY | Facility: CLINIC | Age: 78
End: 2018-06-22

## 2018-06-22 DIAGNOSIS — I50.82 BIVENTRICULAR HEART FAILURE: Primary | ICD-10-CM

## 2018-06-22 NOTE — TELEPHONE ENCOUNTER
Spoke with patient and her daughter about echo and lab results. Will see patient on 07/17 to discuss future steps.    Dee Mckinley MD  Cardiology PGY5

## 2018-06-26 ENCOUNTER — PATIENT MESSAGE (OUTPATIENT)
Dept: INTERNAL MEDICINE | Facility: CLINIC | Age: 78
End: 2018-06-26

## 2018-07-09 ENCOUNTER — TELEPHONE (OUTPATIENT)
Dept: INTERNAL MEDICINE | Facility: CLINIC | Age: 78
End: 2018-07-09

## 2018-07-09 DIAGNOSIS — R21 RASH OF HANDS: ICD-10-CM

## 2018-07-09 DIAGNOSIS — E53.8 B12 DEFICIENCY: Primary | ICD-10-CM

## 2018-07-09 NOTE — TELEPHONE ENCOUNTER
----- Message from Piper Watson sent at 7/9/2018 12:22 PM CDT -----  Contact: Prisca Shelton (Daughter)            Name of Who is Calling: Prisca Shelton (Daughter)      What is the request in detail: Pt's daughter states the pt does not have the hand written prescription for cream for the pt. Pt's daughter wants to speak to the clinical team.      Can the clinic reply by [facility]: N      What Number to Call Back if not in [facility]: [phone]    2:42 PM  Patient's daughter stated that the pharmacy does not carry emollient combination no.40 Lotn and would like another alternative. Prisca also stated that she would like needles for her B12 injections.

## 2018-07-09 NOTE — TELEPHONE ENCOUNTER
Emollient Combination No.40 is not available at the pharmacy. Patient is asking if an alternative can be prescribed or recommended. Sent message to Dr. Rebolledo.

## 2018-07-12 RX ORDER — AMMONIUM LACTATE 12 G/100G
CREAM TOPICAL
Qty: 140 G | Refills: 1 | Status: SHIPPED | OUTPATIENT
Start: 2018-07-12 | End: 2020-01-01

## 2018-07-12 NOTE — TELEPHONE ENCOUNTER
After researching and discussing with compound pharmacys the emollient combination 40 cream is something they can not make as this is just a basic otc lotion which could be any kind of lotion and would need a detailed ingredient list to make .    However after talking to the pt's daughter she states the patient has been itching her hands so much they are now raw and bleeding.     Please advise if you would like to have pt see derm or send in prescription cream. Referral pended.

## 2018-07-12 NOTE — TELEPHONE ENCOUNTER
Per Dr. Rebolledo's instructions, the patient's daughter was advised. States that she will let the patient's Cardiologist take a further look at it

## 2018-07-12 NOTE — TELEPHONE ENCOUNTER
Lac hydrin sent in for moisture. Let pt know may burn slightly due to degree of present skin irritation. Enquire f patient washes hands frequently or uses hand  regularly or F/C

## 2018-07-17 ENCOUNTER — OFFICE VISIT (OUTPATIENT)
Dept: CARDIOLOGY | Facility: CLINIC | Age: 78
End: 2018-07-17
Payer: MEDICARE

## 2018-07-17 VITALS
HEIGHT: 60 IN | DIASTOLIC BLOOD PRESSURE: 54 MMHG | WEIGHT: 184.06 LBS | SYSTOLIC BLOOD PRESSURE: 112 MMHG | HEART RATE: 55 BPM | BODY MASS INDEX: 36.14 KG/M2

## 2018-07-17 DIAGNOSIS — I50.82 BIVENTRICULAR HEART FAILURE: ICD-10-CM

## 2018-07-17 DIAGNOSIS — I34.0 NON-RHEUMATIC MITRAL REGURGITATION: ICD-10-CM

## 2018-07-17 DIAGNOSIS — I10 ESSENTIAL HYPERTENSION: ICD-10-CM

## 2018-07-17 PROCEDURE — 99999 PR PBB SHADOW E&M-EST. PATIENT-LVL IV: CPT | Mod: PBBFAC,GC,, | Performed by: INTERNAL MEDICINE

## 2018-07-17 PROCEDURE — 99214 OFFICE O/P EST MOD 30 MIN: CPT | Mod: PBBFAC | Performed by: INTERNAL MEDICINE

## 2018-07-17 PROCEDURE — 99214 OFFICE O/P EST MOD 30 MIN: CPT | Mod: S$PBB,GC,, | Performed by: INTERNAL MEDICINE

## 2018-07-17 NOTE — PROGRESS NOTES
Subjective:       Patient ID: Cecelia Currie is a 78 y.o. female.    Chief Complaint: Non-rheumatic mitral regurgitation and Edema    HPI   Ms. Currie is a 77 year old female with a past medical history of severe MR, PHTN, HTN, breast cancer 1985 s/p chemo, XRT and mastectomy, MADYSON who presents to [facility] care. She received most of her care from [facility] where she was noted to have severe MR. From the records, it appears that she was evaluated for valve replacement as far back as 2014. She had borderline EF of 40-45%, and received a SANDI with LHC/RHC. I was not able to find the results of the SANDI, but it stated that she had severe MR and moderate-severe TR. Her LHC/RHC  found a subtotal LAD and moderate leasion in her RCA with adequate targets for bypass. RHC numbers could not be found. She was eventually sent for surgical referral, and the notes state that she was denied due to debility and the pulmonary hypertension. She was then referred to interventional cardiology to receive PCI to LAD lesion, however, she did not receive this stent as she did not understand the procedure. She never followed up afterwards. On discussion with the patient and her daughter, who was present during consultation, she was offered the surgery, but the patient refused to receive open heart surgery.    The patient herself was not able to recount any history of her heart disease. She states that she is doing fine and is able to do everything that she wants to do without any problems. Her daughter states that she is only able to walk around the house, and upon leaving the house, she needs a wheelchair. She has not been up the stairs in the house in over 6 months and when she does, she gets chest pressure after 1 flight of stairs. She has been having a lot of fluid in her legs and her abdomen and she had about 2 weeks of increased lasix dose to 20 BID instead of 20 QD. She is only on enalapril 2.5 QD and lasix. She is  already bradycardic at baseline and is thus unable to tolerate beta blocker. She was given aldactone at one point, but developed severe hyperkalemia up to 8mEq.    The daughter notes that she has had poor long term and short term memory for the past year. For the past 3 months, she's been on aricept with no improvement. Reportedly, she has also been having hallucinations as well.    Interval History  Patient has been doing well. She has no complaints. Her daughter is planning on placing her in a senior care center during the day so that she can go back to work. She has a form for the [employer] to fill out. The patient herself states that she has no shortness of breath or chest discomfort. She still has some LE edema, but it doesn't bother her. She has no PND or orthopnea either. I have discussed at length with her about her disease process, prognosis and what to expect going forward.     Cardiac Testing  2D Echo (06/18/18) - EF 25-30%, no WMA, RV enlarged with moderately depressed function, PA pressure of 49mmHg. Severe MR and severe TR.    Review of Systems   Constitutional: Negative for activity change.   HENT: Negative.    Respiratory: Negative for shortness of breath.    Cardiovascular: Negative for palpitations and leg swelling.   Gastrointestinal: Negative for abdominal distention and diarrhea.   Genitourinary: Negative for difficulty urinating, dysuria and urgency.   Skin: Negative for color change.   Neurological: Negative for dizziness and light-headedness.   Psychiatric/Behavioral: Positive for confusion and hallucinations.       Objective:     Vitals:    07/17/18 1124   BP: (!) 112/54   BP Location: Left arm   Patient Position: Sitting   BP Method: Large (Automatic)   Pulse: (!) 55   Weight: 83.5 kg (184 lb 1.4 oz)   Height: 5' (1.524 m)       Physical Exam   Constitutional: Vital signs are normal. She appears well-developed and well-nourished. She is active and cooperative.  Non-toxic appearance. No distress.    Patient has a lot of muscular wasting.   HENT:   Head: Normocephalic and atraumatic.   Mouth/Throat: Uvula is midline, oropharynx is clear and moist and mucous membranes are normal.   Eyes: Conjunctivae and lids are normal. Pupils are equal, round, and reactive to light.   Neck: Normal carotid pulses and no JVD present. Carotid bruit is not present.   Cardiovascular: Normal rate, regular rhythm, S1 normal, S2 normal, normal heart sounds and normal pulses.  Exam reveals no gallop.    No murmur heard.  Pulses:       Radial pulses are 2+ on the right side, and 2+ on the left side.        Dorsalis pedis pulses are 2+ on the right side, and 2+ on the left side.        Posterior tibial pulses are 2+ on the right side, and 2+ on the left side.   Tense LE edema up to lower abdomen.   Pulmonary/Chest: Effort normal and breath sounds normal. No accessory muscle usage. No respiratory distress. She has no decreased breath sounds. She has no wheezes. She has no rhonchi. She has no rales.   Abdominal: Soft. Bowel sounds are normal. She exhibits no distension and no mass. There is no tenderness.   Neurological: She is alert.   Skin: Skin is warm, dry and intact.       Assessment:       1. Biventricular heart failure    2. Essential hypertension    3. Non-rheumatic mitral regurgitation        Plan:     Biventricular heart failure  --patient with biventricular heart failure with EF of 25-30% related to severe MR that was not repaired  --I advised that her EF will continue to decline further due to the leaky valve, and at some point, her symptoms will get worse.  --I discussed with her and her daughter who is the power of  that they should have a living will and advanced directive in place going forward  --I have obtained an [employer] form and have filled it out with the patient and her daughter. The patient wishes to be DNR/DNI and if at any time, if her heart were to stop, she would like to pass naturally. If there are other  procedures such as dialysis or NGT for feeding though, she is agreeable to those.  --She would like to focus on comfort at this time with regards to her CHF. She does not want surgery, angiograms, etc. but if there are antibiotics of fluids or other medications that are needed to improve her quality of life, then she is agreeable to such.  -- I have discussed with the patient and her daughter about hospice, which is available when she needs. She is doing well currently, and thus I do not think that she is a candidate for it right now, but I have discussed that when she gets worse, such services are available to her.      Essential hypertension  --well controlled  --continue to monitor    Non-rheumatic mitral regurgitation  --continue to monitor    Staff addendum to follow    Dee Mckinley MD  Cardiology PGY5

## 2018-07-17 NOTE — ASSESSMENT & PLAN NOTE
--patient with biventricular heart failure with EF of 25-30% related to severe MR that was not repaired  --I advised that her EF will continue to decline further due to the leaky valve, and at some point, her symptoms will get worse.  --I discussed with her and her daughter who is the power of  that they should have a living will and advanced directive in place going forward  --I have obtained an [employer] form and have filled it out with the patient and her daughter. The patient wishes to be DNR/DNI and if at any time, if her heart were to stop, she would like to pass naturally. If there are other procedures such as dialysis or NGT for feeding though, she is agreeable to those.  --She would like to focus on comfort at this time with regards to her CHF. She does not want surgery, angiograms, etc. but if there are antibiotics of fluids or other medications that are needed to improve her quality of life, then she is agreeable to such.  -- I have discussed with the patient and her daughter about hospice, which is available when she needs. She is doing well currently, and thus I do not think that she is a candidate for it right now, but I have discussed that when she gets worse, such services are available to her.

## 2018-07-30 ENCOUNTER — INITIAL CONSULT (OUTPATIENT)
Dept: DERMATOLOGY | Facility: CLINIC | Age: 78
End: 2018-07-30
Attending: INTERNAL MEDICINE
Payer: MEDICARE

## 2018-07-30 DIAGNOSIS — Z22.321 SUSPECTED CARRIER OF METHICILLIN SUSCEPTIBLE STAPHYLOCOCCUS AUREUS (MSSA): ICD-10-CM

## 2018-07-30 DIAGNOSIS — L01.00 IMPETIGO: Primary | ICD-10-CM

## 2018-07-30 DIAGNOSIS — L81.9 POSTINFLAMMATORY PIGMENTARY CHANGES: ICD-10-CM

## 2018-07-30 PROCEDURE — 99203 OFFICE O/P NEW LOW 30 MIN: CPT | Mod: S$GLB,,, | Performed by: DERMATOLOGY

## 2018-07-30 RX ORDER — MUPIROCIN 20 MG/G
OINTMENT TOPICAL
Qty: 44 G | Refills: 2 | Status: ON HOLD | OUTPATIENT
Start: 2018-07-30 | End: 2019-02-09 | Stop reason: HOSPADM

## 2018-07-30 RX ORDER — SYRINGE WITH NEEDLE, 1 ML 28GX1/2"
SYRINGE, EMPTY DISPOSABLE MISCELLANEOUS
Refills: 2 | COMMUNITY
Start: 2018-07-17 | End: 2019-02-19 | Stop reason: SDUPTHER

## 2018-07-30 NOTE — PROGRESS NOTES
Subjective:       Patient ID:  Cecelia Currie is a 78 y.o. female who presents for   Chief Complaint   Patient presents with    Lesion     R upper torso, back and L leg     Lesion  - Initial  Affected locations: diffuse  Duration: several years.  Signs / symptoms: itching, bleeding and redness  Severity: moderate  Timing: intermittent  Aggravated by: scratching and picking  Treatments tried: vaseline.  Improvement on treatment: mild      Review of Systems   Skin: Positive for itching and dry skin. Negative for recent sunburn.   Hematologic/Lymphatic: Does not bruise/bleed easily.        Objective:    Physical Exam   Constitutional: She appears well-developed and well-nourished. No distress.   Neurological: She is alert and oriented to person, place, and time. She is not disoriented.   Psychiatric: She has a normal mood and affect.   Skin:   Areas Examined (abnormalities noted in diagram):   Head / Face Inspection Performed  Neck Inspection Performed  Chest / Axilla Inspection Performed  Abdomen Inspection Performed  Back Inspection Performed  RUE Inspected  LUE Inspection Performed  RLE Inspected  LLE Inspection Performed              Diagram Legend     Erythematous scaling macule/papule c/w actinic keratosis       Vascular papule c/w angioma      Pigmented verrucoid papule/plaque c/w seborrheic keratosis      Yellow umbilicated papule c/w sebaceous hyperplasia      Irregularly shaped tan macule c/w lentigo     1-2 mm smooth white papules consistent with Milia      Movable subcutaneous cyst with punctum c/w epidermal inclusion cyst      Subcutaneous movable cyst c/w pilar cyst      Firm pink to brown papule c/w dermatofibroma      Pedunculated fleshy papule(s) c/w skin tag(s)      Evenly pigmented macule c/w junctional nevus     Mildly variegated pigmented, slightly irregular-bordered macule c/w mildly atypical nevus      Flesh colored to evenly pigmented papule c/w intradermal nevus       Pink pearly  papule/plaque c/w basal cell carcinoma      Erythematous hyperkeratotic cursted plaque c/w SCC      Surgical scar with no sign of skin cancer recurrence      Open and closed comedones      Inflammatory papules and pustules      Verrucoid papule consistent consistent with wart     Erythematous eczematous patches and plaques     Dystrophic onycholytic nail with subungual debris c/w onychomycosis     Umbilicated papule    Erythematous-base heme-crusted tan verrucoid plaque consistent with inflamed seborrheic keratosis     Erythematous Silvery Scaling Plaque c/w Psoriasis     See annotation      Assessment / Plan:        Impetigo  -     mupirocin (BACTROBAN) 2 % ointment; Apply to affected areas of body TID x 1-2 weeks.  Dispense: 44 g; Refill: 2  -  Keep covered with a bandaid. D/C scratching.    Suspected carrier of methicillin susceptible Staphylococcus aureus (MSSA)  Recommended dilute bleach baths 2x/week, and written instructions were provided.  Recommended Hibiclens solution.    Postinflammatory pigmentary changes  This is a normal discoloration of the skin after an inflammatory process has resolved. It may take months to years to fade.  Patient instructed on importance of daily sun protection with a broad-spectrum sunscreen of SPF 30 or higher. Sun avoidance and topical protection discussed.     Follow-up in about 3 weeks (around 8/20/2018), or if symptoms worsen or fail to improve.

## 2018-07-30 NOTE — LETTER
July 30, 2018      Keith Davis MD  [address]  [address]  [facility] [address]           [facility] - Dermatology  [address] [place] [id number]  Phone: [phone]  Fax: [phone]          Patient: Cecelia Currie   MR Number: [id number]   YOB: 1940   Date of Visit: 7/30/2018       Dear Dr. Keith Davis:    Thank you for referring Cecelia Currie to me for evaluation. Attached you will find relevant portions of my assessment and plan of care.    If you have questions, please do not hesitate to call me. I look forward to following Cecelia Currie along with you.    Sincerely,    Kristy Johnson MD    Enclosure  CC:  No Recipients    If you would like to receive this communication electronically, please contact [email or web address] or [phone] to request more information on [employer] Link access.    For providers and/or their staff who would like to refer a patient to [facility], please contact us through our one-stop-shop provider referral line, [facility], at [phone].    If you feel you have received this communication in error or would no longer like to receive these types of communications, please e-mail [email or web address]

## 2018-10-01 ENCOUNTER — TELEPHONE (OUTPATIENT)
Dept: OPTOMETRY | Facility: CLINIC | Age: 78
End: 2018-10-01

## 2018-10-01 DIAGNOSIS — I50.43 ACUTE ON CHRONIC COMBINED SYSTOLIC AND DIASTOLIC HEART FAILURE: ICD-10-CM

## 2018-10-01 DIAGNOSIS — E55.9 VITAMIN D DEFICIENCY: ICD-10-CM

## 2018-10-01 DIAGNOSIS — H40.9 GLAUCOMA, UNSPECIFIED GLAUCOMA TYPE, UNSPECIFIED LATERALITY: ICD-10-CM

## 2018-10-01 DIAGNOSIS — G47.33 OSA (OBSTRUCTIVE SLEEP APNEA): ICD-10-CM

## 2018-10-01 DIAGNOSIS — F03.90 DEMENTIA WITHOUT BEHAVIORAL DISTURBANCE, UNSPECIFIED DEMENTIA TYPE: ICD-10-CM

## 2018-10-01 DIAGNOSIS — E66.01 MORBID OBESITY: ICD-10-CM

## 2018-10-01 DIAGNOSIS — E53.8 B12 DEFICIENCY: ICD-10-CM

## 2018-10-01 DIAGNOSIS — E78.5 HYPERLIPIDEMIA, UNSPECIFIED HYPERLIPIDEMIA TYPE: ICD-10-CM

## 2018-10-01 DIAGNOSIS — M81.0 OSTEOPOROSIS, UNSPECIFIED OSTEOPOROSIS TYPE, UNSPECIFIED PATHOLOGICAL FRACTURE PRESENCE: ICD-10-CM

## 2018-10-01 DIAGNOSIS — I34.0 MITRAL VALVE INSUFFICIENCY, UNSPECIFIED ETIOLOGY: ICD-10-CM

## 2018-10-01 RX ORDER — FUROSEMIDE 20 MG/1
TABLET ORAL
Qty: 180 TABLET | Refills: 3 | Status: SHIPPED | OUTPATIENT
Start: 2018-10-01 | End: 2019-02-19 | Stop reason: SDUPTHER

## 2019-02-07 ENCOUNTER — HOSPITAL ENCOUNTER (INPATIENT)
Facility: OTHER | Age: 79
LOS: 2 days | Discharge: HOME | DRG: 292 | End: 2019-02-09
Attending: EMERGENCY MEDICINE | Admitting: HOSPITALIST
Payer: MEDICARE

## 2019-02-07 ENCOUNTER — PATIENT MESSAGE (OUTPATIENT)
Dept: INTERNAL MEDICINE | Facility: CLINIC | Age: 79
End: 2019-02-07

## 2019-02-07 ENCOUNTER — PATIENT MESSAGE (OUTPATIENT)
Dept: CARDIOLOGY | Facility: CLINIC | Age: 79
End: 2019-02-07

## 2019-02-07 DIAGNOSIS — J10.1 INFLUENZA A: Primary | ICD-10-CM

## 2019-02-07 DIAGNOSIS — R06.00 DYSPNEA: ICD-10-CM

## 2019-02-07 DIAGNOSIS — I50.9 ACUTE EXACERBATION OF CHF (CONGESTIVE HEART FAILURE): ICD-10-CM

## 2019-02-07 DIAGNOSIS — R79.89 ELEVATED TROPONIN: ICD-10-CM

## 2019-02-07 DIAGNOSIS — F42.4 EXCORIATION (SKIN-PICKING) DISORDER: ICD-10-CM

## 2019-02-07 DIAGNOSIS — I50.9 CONGESTIVE HEART FAILURE, UNSPECIFIED HF CHRONICITY, UNSPECIFIED HEART FAILURE TYPE: ICD-10-CM

## 2019-02-07 DIAGNOSIS — R05.9 COUGH: ICD-10-CM

## 2019-02-07 DIAGNOSIS — I50.23 ACUTE ON CHRONIC SYSTOLIC CONGESTIVE HEART FAILURE: ICD-10-CM

## 2019-02-07 DIAGNOSIS — R79.89 ELEVATED BRAIN NATRIURETIC PEPTIDE (BNP) LEVEL: ICD-10-CM

## 2019-02-07 DIAGNOSIS — I10 ESSENTIAL HYPERTENSION: ICD-10-CM

## 2019-02-07 DIAGNOSIS — I50.9 HEART FAILURE: ICD-10-CM

## 2019-02-07 DIAGNOSIS — N17.9 AKI (ACUTE KIDNEY INJURY): ICD-10-CM

## 2019-02-07 PROBLEM — I25.10 CAD (CORONARY ARTERY DISEASE): Status: ACTIVE | Noted: 2019-02-07

## 2019-02-07 LAB
ANION GAP SERPL CALC-SCNC: 14 MMOL/L
BASOPHILS # BLD AUTO: 0.01 K/UL
BASOPHILS NFR BLD: 0.1 %
BNP SERPL-MCNC: 3059 PG/ML
BUN SERPL-MCNC: 29 MG/DL
CALCIUM SERPL-MCNC: 9.2 MG/DL
CHLORIDE SERPL-SCNC: 103 MMOL/L
CO2 SERPL-SCNC: 18 MMOL/L
CREAT SERPL-MCNC: 1.3 MG/DL
DIFFERENTIAL METHOD: ABNORMAL
EOSINOPHIL # BLD AUTO: 0 K/UL
EOSINOPHIL NFR BLD: 0 %
ERYTHROCYTE [DISTWIDTH] IN BLOOD BY AUTOMATED COUNT: 14.4 %
EST. GFR  (AFRICAN AMERICAN): 45 ML/MIN/1.73 M^2
EST. GFR  (NON AFRICAN AMERICAN): 39 ML/MIN/1.73 M^2
GLUCOSE SERPL-MCNC: 160 MG/DL
HCT VFR BLD AUTO: 45 %
HGB BLD-MCNC: 14.9 G/DL
INFLUENZA A, MOLECULAR: POSITIVE
INFLUENZA B, MOLECULAR: NEGATIVE
LYMPHOCYTES # BLD AUTO: 0.6 K/UL
LYMPHOCYTES NFR BLD: 6.3 %
MCH RBC QN AUTO: 30 PG
MCHC RBC AUTO-ENTMCNC: 33.1 G/DL
MCV RBC AUTO: 91 FL
MONOCYTES # BLD AUTO: 1.2 K/UL
MONOCYTES NFR BLD: 13.1 %
NEUTROPHILS # BLD AUTO: 7.5 K/UL
NEUTROPHILS NFR BLD: 80.1 %
PLATELET # BLD AUTO: 242 K/UL
PMV BLD AUTO: 9.7 FL
POTASSIUM SERPL-SCNC: 4.6 MMOL/L
RBC # BLD AUTO: 4.97 M/UL
SODIUM SERPL-SCNC: 135 MMOL/L
SPECIMEN SOURCE: ABNORMAL
TROPONIN I SERPL DL<=0.01 NG/ML-MCNC: 0.04 NG/ML
WBC # BLD AUTO: 9.32 K/UL

## 2019-02-07 PROCEDURE — 83880 ASSAY OF NATRIURETIC PEPTIDE: CPT

## 2019-02-07 PROCEDURE — 93010 ELECTROCARDIOGRAM REPORT: CPT | Mod: ,,, | Performed by: INTERNAL MEDICINE

## 2019-02-07 PROCEDURE — 12000002 HC ACUTE/MED SURGE SEMI-PRIVATE ROOM

## 2019-02-07 PROCEDURE — 93010 EKG 12-LEAD: ICD-10-PCS | Mod: ,,, | Performed by: INTERNAL MEDICINE

## 2019-02-07 PROCEDURE — 84484 ASSAY OF TROPONIN QUANT: CPT

## 2019-02-07 PROCEDURE — 63600175 PHARM REV CODE 636 W HCPCS: Performed by: EMERGENCY MEDICINE

## 2019-02-07 PROCEDURE — 93005 ELECTROCARDIOGRAM TRACING: CPT

## 2019-02-07 PROCEDURE — 80048 BASIC METABOLIC PNL TOTAL CA: CPT

## 2019-02-07 PROCEDURE — 25000003 PHARM REV CODE 250: Performed by: EMERGENCY MEDICINE

## 2019-02-07 PROCEDURE — 99285 EMERGENCY DEPT VISIT HI MDM: CPT | Mod: 25

## 2019-02-07 PROCEDURE — 85025 COMPLETE CBC W/AUTO DIFF WBC: CPT

## 2019-02-07 PROCEDURE — 25000242 PHARM REV CODE 250 ALT 637 W/ HCPCS: Performed by: EMERGENCY MEDICINE

## 2019-02-07 PROCEDURE — 96374 THER/PROPH/DIAG INJ IV PUSH: CPT

## 2019-02-07 PROCEDURE — 87502 INFLUENZA DNA AMP PROBE: CPT

## 2019-02-07 PROCEDURE — 94640 AIRWAY INHALATION TREATMENT: CPT

## 2019-02-07 RX ORDER — ALBUTEROL SULFATE 0.83 MG/ML
5 SOLUTION RESPIRATORY (INHALATION) ONCE
Status: COMPLETED | OUTPATIENT
Start: 2019-02-07 | End: 2019-02-07

## 2019-02-07 RX ORDER — FUROSEMIDE 10 MG/ML
20 INJECTION INTRAMUSCULAR; INTRAVENOUS
Status: COMPLETED | OUTPATIENT
Start: 2019-02-07 | End: 2019-02-07

## 2019-02-07 RX ORDER — OSELTAMIVIR PHOSPHATE 75 MG/1
75 CAPSULE ORAL
Status: COMPLETED | OUTPATIENT
Start: 2019-02-07 | End: 2019-02-07

## 2019-02-07 RX ORDER — ASPIRIN 325 MG
325 TABLET, DELAYED RELEASE (ENTERIC COATED) ORAL
Status: COMPLETED | OUTPATIENT
Start: 2019-02-07 | End: 2019-02-07

## 2019-02-07 RX ADMIN — BACITRACIN ZINC NEOMYCIN SULFATE POLYMYXIN B SULFATE 15 G: 400; 3.5; 5 OINTMENT TOPICAL at 11:02

## 2019-02-07 RX ADMIN — ASPIRIN 325 MG: 325 TABLET, DELAYED RELEASE ORAL at 10:02

## 2019-02-07 RX ADMIN — ALBUTEROL SULFATE 5 MG: 2.5 SOLUTION RESPIRATORY (INHALATION) at 09:02

## 2019-02-07 RX ADMIN — FUROSEMIDE 20 MG: 10 INJECTION, SOLUTION INTRAVENOUS at 10:02

## 2019-02-07 RX ADMIN — OSELTAMIVIR PHOSPHATE 75 MG: 75 CAPSULE ORAL at 09:02

## 2019-02-08 LAB
ANION GAP SERPL CALC-SCNC: 12 MMOL/L
BASOPHILS # BLD AUTO: 0.01 K/UL
BASOPHILS NFR BLD: 0.1 %
BUN SERPL-MCNC: 30 MG/DL
CALCIUM SERPL-MCNC: 9.1 MG/DL
CHLORIDE SERPL-SCNC: 103 MMOL/L
CHOLEST SERPL-MCNC: 141 MG/DL
CHOLEST/HDLC SERPL: 3.2 {RATIO}
CK MB SERPL-MCNC: 1.3 NG/ML
CK MB SERPL-MCNC: 1.4 NG/ML
CK MB SERPL-MCNC: 1.5 NG/ML
CK MB SERPL-MCNC: 1.5 NG/ML
CK MB SERPL-RTO: 0.9 %
CK MB SERPL-RTO: 1.1 %
CK MB SERPL-RTO: 1.2 %
CK MB SERPL-RTO: 1.3 %
CK SERPL-CCNC: 118 U/L
CK SERPL-CCNC: 123 U/L
CK SERPL-CCNC: 128 U/L
CK SERPL-CCNC: 139 U/L
CO2 SERPL-SCNC: 21 MMOL/L
CREAT SERPL-MCNC: 1.4 MG/DL
CREAT UR-MCNC: 28.9 MG/DL
DIFFERENTIAL METHOD: NORMAL
EOSINOPHIL # BLD AUTO: 0 K/UL
EOSINOPHIL NFR BLD: 0.1 %
ERYTHROCYTE [DISTWIDTH] IN BLOOD BY AUTOMATED COUNT: 14.4 %
EST. GFR  (AFRICAN AMERICAN): 41 ML/MIN/1.73 M^2
EST. GFR  (NON AFRICAN AMERICAN): 36 ML/MIN/1.73 M^2
ESTIMATED AVG GLUCOSE: 123 MG/DL
GLUCOSE SERPL-MCNC: 102 MG/DL
HBA1C MFR BLD HPLC: 5.9 %
HCT VFR BLD AUTO: 43.9 %
HDLC SERPL-MCNC: 44 MG/DL
HDLC SERPL: 31.2 %
HGB BLD-MCNC: 14.4 G/DL
LDLC SERPL CALC-MCNC: 83.6 MG/DL
LYMPHOCYTES # BLD AUTO: 1.4 K/UL
LYMPHOCYTES NFR BLD: 21 %
MAGNESIUM SERPL-MCNC: 2.1 MG/DL
MCH RBC QN AUTO: 29.8 PG
MCHC RBC AUTO-ENTMCNC: 32.8 G/DL
MCV RBC AUTO: 91 FL
MONOCYTES # BLD AUTO: 0.5 K/UL
MONOCYTES NFR BLD: 7.5 %
NEUTROPHILS # BLD AUTO: 4.8 K/UL
NEUTROPHILS NFR BLD: 71.2 %
NONHDLC SERPL-MCNC: 97 MG/DL
OSMOLALITY UR: 351 MOSM/KG
PHOSPHATE SERPL-MCNC: 4 MG/DL
PLATELET # BLD AUTO: 249 K/UL
PMV BLD AUTO: 9.6 FL
POTASSIUM SERPL-SCNC: 3.8 MMOL/L
PROT UR-MCNC: <7 MG/DL
PROT/CREAT UR: NORMAL MG/G{CREAT}
RBC # BLD AUTO: 4.84 M/UL
SODIUM SERPL-SCNC: 136 MMOL/L
SODIUM UR-SCNC: 109 MMOL/L
TRIGL SERPL-MCNC: 67 MG/DL
TROPONIN I SERPL DL<=0.01 NG/ML-MCNC: 0.04 NG/ML
TROPONIN I SERPL DL<=0.01 NG/ML-MCNC: 0.05 NG/ML
WBC # BLD AUTO: 6.71 K/UL

## 2019-02-08 PROCEDURE — 93010 EKG 12-LEAD: ICD-10-PCS | Mod: ,,, | Performed by: INTERNAL MEDICINE

## 2019-02-08 PROCEDURE — 63600175 PHARM REV CODE 636 W HCPCS: Performed by: PHYSICIAN ASSISTANT

## 2019-02-08 PROCEDURE — 99223 1ST HOSP IP/OBS HIGH 75: CPT | Mod: AI,,, | Performed by: HOSPITALIST

## 2019-02-08 PROCEDURE — 93005 ELECTROCARDIOGRAM TRACING: CPT

## 2019-02-08 PROCEDURE — 84484 ASSAY OF TROPONIN QUANT: CPT | Mod: 91

## 2019-02-08 PROCEDURE — 85025 COMPLETE CBC W/AUTO DIFF WBC: CPT

## 2019-02-08 PROCEDURE — 63600175 PHARM REV CODE 636 W HCPCS: Performed by: HOSPITALIST

## 2019-02-08 PROCEDURE — 80048 BASIC METABOLIC PNL TOTAL CA: CPT

## 2019-02-08 PROCEDURE — 82553 CREATINE MB FRACTION: CPT

## 2019-02-08 PROCEDURE — 82550 ASSAY OF CK (CPK): CPT

## 2019-02-08 PROCEDURE — 25000003 PHARM REV CODE 250: Performed by: PHYSICIAN ASSISTANT

## 2019-02-08 PROCEDURE — 84300 ASSAY OF URINE SODIUM: CPT

## 2019-02-08 PROCEDURE — 83935 ASSAY OF URINE OSMOLALITY: CPT

## 2019-02-08 PROCEDURE — 93010 ELECTROCARDIOGRAM REPORT: CPT | Mod: ,,, | Performed by: INTERNAL MEDICINE

## 2019-02-08 PROCEDURE — 94761 N-INVAS EAR/PLS OXIMETRY MLT: CPT

## 2019-02-08 PROCEDURE — 36415 COLL VENOUS BLD VENIPUNCTURE: CPT

## 2019-02-08 PROCEDURE — 83036 HEMOGLOBIN GLYCOSYLATED A1C: CPT

## 2019-02-08 PROCEDURE — A4216 STERILE WATER/SALINE, 10 ML: HCPCS | Performed by: EMERGENCY MEDICINE

## 2019-02-08 PROCEDURE — 99900035 HC TECH TIME PER 15 MIN (STAT)

## 2019-02-08 PROCEDURE — 99223 PR INITIAL HOSPITAL CARE,LEVL III: ICD-10-PCS | Mod: AI,,, | Performed by: HOSPITALIST

## 2019-02-08 PROCEDURE — 80061 LIPID PANEL: CPT

## 2019-02-08 PROCEDURE — 84156 ASSAY OF PROTEIN URINE: CPT

## 2019-02-08 PROCEDURE — 25000003 PHARM REV CODE 250: Performed by: EMERGENCY MEDICINE

## 2019-02-08 PROCEDURE — 84100 ASSAY OF PHOSPHORUS: CPT

## 2019-02-08 PROCEDURE — 83735 ASSAY OF MAGNESIUM: CPT

## 2019-02-08 PROCEDURE — 11000001 HC ACUTE MED/SURG PRIVATE ROOM

## 2019-02-08 RX ORDER — HEPARIN SODIUM 5000 [USP'U]/ML
5000 INJECTION, SOLUTION INTRAVENOUS; SUBCUTANEOUS EVERY 12 HOURS
Status: DISCONTINUED | OUTPATIENT
Start: 2019-02-08 | End: 2019-02-09 | Stop reason: HOSPADM

## 2019-02-08 RX ORDER — OSELTAMIVIR PHOSPHATE 6 MG/ML
30 FOR SUSPENSION ORAL 2 TIMES DAILY
Status: DISCONTINUED | OUTPATIENT
Start: 2019-02-08 | End: 2019-02-09 | Stop reason: HOSPADM

## 2019-02-08 RX ORDER — ACETAMINOPHEN 325 MG/1
650 TABLET ORAL EVERY 4 HOURS PRN
Status: DISCONTINUED | OUTPATIENT
Start: 2019-02-08 | End: 2019-02-09 | Stop reason: HOSPADM

## 2019-02-08 RX ORDER — SODIUM CHLORIDE 0.9 % (FLUSH) 0.9 %
5 SYRINGE (ML) INJECTION
Status: DISCONTINUED | OUTPATIENT
Start: 2019-02-08 | End: 2019-02-09 | Stop reason: HOSPADM

## 2019-02-08 RX ORDER — ONDANSETRON 8 MG/1
8 TABLET, ORALLY DISINTEGRATING ORAL EVERY 8 HOURS PRN
Status: DISCONTINUED | OUTPATIENT
Start: 2019-02-08 | End: 2019-02-09 | Stop reason: HOSPADM

## 2019-02-08 RX ORDER — ENALAPRIL MALEATE 2.5 MG/1
2.5 TABLET ORAL DAILY
Status: DISCONTINUED | OUTPATIENT
Start: 2019-02-08 | End: 2019-02-09 | Stop reason: HOSPADM

## 2019-02-08 RX ORDER — FUROSEMIDE 10 MG/ML
40 INJECTION INTRAMUSCULAR; INTRAVENOUS 2 TIMES DAILY
Status: DISCONTINUED | OUTPATIENT
Start: 2019-02-08 | End: 2019-02-09 | Stop reason: HOSPADM

## 2019-02-08 RX ORDER — DONEPEZIL HYDROCHLORIDE 5 MG/1
5 TABLET, FILM COATED ORAL DAILY
Status: DISCONTINUED | OUTPATIENT
Start: 2019-02-08 | End: 2019-02-09 | Stop reason: HOSPADM

## 2019-02-08 RX ORDER — SODIUM CHLORIDE 0.9 % (FLUSH) 0.9 %
3 SYRINGE (ML) INJECTION EVERY 8 HOURS
Status: DISCONTINUED | OUTPATIENT
Start: 2019-02-08 | End: 2019-02-09 | Stop reason: HOSPADM

## 2019-02-08 RX ORDER — LATANOPROST 50 UG/ML
1 SOLUTION/ DROPS OPHTHALMIC DAILY
Status: DISCONTINUED | OUTPATIENT
Start: 2019-02-08 | End: 2019-02-09 | Stop reason: HOSPADM

## 2019-02-08 RX ORDER — FUROSEMIDE 10 MG/ML
20 INJECTION INTRAMUSCULAR; INTRAVENOUS ONCE
Status: COMPLETED | OUTPATIENT
Start: 2019-02-08 | End: 2019-02-08

## 2019-02-08 RX ORDER — OSELTAMIVIR PHOSPHATE 75 MG/1
75 CAPSULE ORAL DAILY
Status: DISCONTINUED | OUTPATIENT
Start: 2019-02-08 | End: 2019-02-08

## 2019-02-08 RX ADMIN — FUROSEMIDE 20 MG: 10 INJECTION, SOLUTION INTRAVENOUS at 04:02

## 2019-02-08 RX ADMIN — Medication 3 ML: at 04:02

## 2019-02-08 RX ADMIN — HEPARIN SODIUM 5000 UNITS: 5000 INJECTION, SOLUTION INTRAVENOUS; SUBCUTANEOUS at 09:02

## 2019-02-08 RX ADMIN — LATANOPROST 1 DROP: 50 SOLUTION OPHTHALMIC at 09:02

## 2019-02-08 RX ADMIN — OSELTAMIVIR PHOSPHATE 30 MG: 6 POWDER, FOR SUSPENSION ORAL at 08:02

## 2019-02-08 RX ADMIN — OSELTAMIVIR PHOSPHATE 30 MG: 6 POWDER, FOR SUSPENSION ORAL at 09:02

## 2019-02-08 RX ADMIN — Medication 3 ML: at 08:02

## 2019-02-08 RX ADMIN — FUROSEMIDE 40 MG: 10 INJECTION, SOLUTION INTRAVENOUS at 08:02

## 2019-02-08 RX ADMIN — FUROSEMIDE 20 MG: 10 INJECTION, SOLUTION INTRAVENOUS at 12:02

## 2019-02-08 RX ADMIN — ENALAPRIL MALEATE 2.5 MG: 2.5 TABLET ORAL at 09:02

## 2019-02-08 RX ADMIN — HEPARIN SODIUM 5000 UNITS: 5000 INJECTION, SOLUTION INTRAVENOUS; SUBCUTANEOUS at 08:02

## 2019-02-08 RX ADMIN — DONEPEZIL HYDROCHLORIDE 5 MG: 5 TABLET, FILM COATED ORAL at 09:02

## 2019-02-08 NOTE — H&P
[facility]  Hospital Medicine  History & Physical    Patient Name: Ceceila Currie  MRN: [id number]  Admission Date: 2/7/2019  Attending Physician: Abdiaziz Gomez MD   Primary Care Provider: Margaret Rebolledo MD         Patient information was obtained from patient, past medical records and ER records.     Subjective:     Principal Problem:Acute exacerbation of CHF (congestive heart failure)    Chief Complaint:   Chief Complaint   Patient presents with    Fever     Daughter states pt has had a cough for the past two days with a temp of 102 today. She was given tylenol at 1620 today.     Cough        HPI: Mr. Cecelia Currie is a 78 y.o. female, with PMH of HTN, systolic and diastolic CHF, CAD, HLD , and dementia, who presented to [facility] ED on 2/7/19 2/2 SOB. Associated symptoms included fatigue, and non-productive cough x 2 days, and fever of 102.3F today. The patient was evaluated in the ED, and was positive for influenza A, and did also have elevated BNP of 3059. A troponin was elevated without new T wave changes, but with persistence of lateral T wave inversions. She was treated with tamiflu, lasix and ASA in the ED, and was admitted to inpatient status.     Past Medical History:   Diagnosis Date    Aortic stenosis     Breast cancer 1987    Glaucoma     Glaucoma     Heart disease     Heart failure     HTN (hypertension)     Morbid obesity     MADYSON (obstructive sleep apnea)     Osteoporosis     Tricuspid regurgitation     Vitamin D deficiency     Xerosis of skin        Past Surgical History:   Procedure Laterality Date    CATARACT EXTRACTION      MASTECTOMY Right        Review of patient's allergies indicates:  No Known Allergies    No current facility-administered medications on file prior to encounter.      Current Outpatient Medications on File Prior to Encounter   Medication Sig    alendronate (FOSAMAX) 70 MG tablet Take 1 tablet (70 mg total) by mouth  "every 7 days.    ammonium lactate 12 % Crea Apply topically twice a day. Rub in well    BD SAFETY-AGUS TUBERCULIN 1 mL 25 gauge x 5/8" Syrg USE FOR B 12 INJECTIONS    cyanocobalamin 1,000 mcg/mL injection INJECT 1ML INTO THE MUSCLE Q 30 DAYS    donepezil (ARICEPT) 5 MG tablet Take 1 tablet (5 mg total) by mouth once daily.    emollient combination no.40 Lotn apply to her skin bid    enalapril (VASOTEC) 2.5 MG tablet Take 1 tablet (2.5 mg total) by mouth once daily.    ergocalciferol (ERGOCALCIFEROL) 50,000 unit Cap Take 1 capsule (50,000 Units total) by mouth every 7 days.    furosemide (LASIX) 20 MG tablet TAKE 1 TABLET BY MOUTH TWICE DAILY    latanoprost 0.005 % ophthalmic solution INSTILL 1 DROP IN BOTH EYES DAILY    mupirocin (BACTROBAN) 2 % ointment Apply to affected areas of body TID x 1-2 weeks.    needle, disp, 25 gauge 25 gauge x 1" Ndle For use with B12 injections.     Family History     Problem Relation (Age of Onset)    Diabetes Mother, Brother, Brother    Heart disease Paternal Grandmother    Heart failure Mother, Brother    Hypertension Mother        Tobacco Use    Smoking status: Never Smoker    Smokeless tobacco: Never Used   Substance and Sexual Activity    Alcohol use: Not on file    Drug use: No    Sexual activity: Not on file     Review of Systems   Constitutional: Positive for chills, diaphoresis and fever. Negative for activity change, appetite change and fatigue.   HENT: Negative for congestion, ear pain, postnasal drip, rhinorrhea, sinus pressure, sore throat and trouble swallowing.    Respiratory: Positive for cough, shortness of breath and wheezing.    Cardiovascular: Negative for chest pain and palpitations.   Gastrointestinal: Negative for abdominal pain, diarrhea, nausea and vomiting.   Genitourinary: Negative for dysuria, flank pain, frequency, hematuria and urgency.   Skin: Negative for color change, pallor, rash and wound.   Neurological: Negative for dizziness, " weakness, light-headedness and headaches.     Objective:     Vital Signs (Most Recent):  Temp: 98.5 °F (36.9 °C) (02/08/19 0400)  Pulse: 72 (02/08/19 0400)  Resp: 20 (02/08/19 0400)  BP: 106/61 (02/08/19 0400)  SpO2: 96 % (02/08/19 0400) Vital Signs (24h Range):  Temp:  [98 °F (36.7 °C)-98.6 °F (37 °C)] 98.5 °F (36.9 °C)  Pulse:  [62-78] 72  Resp:  [16-24] 20  SpO2:  [95 %-100 %] 96 %  BP: (106-135)/(58-75) 106/61     Weight: 74.3 kg (163 lb 12.8 oz)  Body mass index is 31.99 kg/m².    Physical Exam   Constitutional: She is oriented to person, place, and time. She appears well-developed and well-nourished. No distress.   HENT:   Head: Normocephalic and atraumatic.   Eyes: Conjunctivae and EOM are normal. Pupils are equal, round, and reactive to light. No scleral icterus.   Neck: Normal range of motion. Neck supple. No tracheal deviation present.   Cardiovascular: Normal rate, regular rhythm, normal heart sounds and intact distal pulses. Exam reveals no gallop and no friction rub.   No murmur heard.  Pulmonary/Chest: Effort normal. No stridor. No respiratory distress. She has wheezes (diffuse). She has no rales.   Decreased breath sounds in right lower lobe.    Abdominal: Soft. Bowel sounds are normal. She exhibits no distension. There is no tenderness. There is no guarding.   Musculoskeletal: Normal range of motion. She exhibits no deformity.   Neurological: She is alert and oriented to person, place, and time.   Skin: Skin is warm and dry. She is not diaphoretic.   Psychiatric: She has a normal mood and affect. Her behavior is normal. Judgment and thought content normal.   Nursing note and vitals reviewed.        CRANIAL NERVES     CN III, IV, VI   Pupils are equal, round, and reactive to light.  Extraocular motions are normal.        Significant Labs:   BMP:   Recent Labs   Lab 02/07/19 2059 02/08/19  0238   *  --    *  --    K 4.6  --      --    CO2 18*  --    BUN 29*  --    CREATININE 1.3  --     CALCIUM 9.2  --    MG  --  2.1     CBC:   Recent Labs   Lab 02/07/19 2059   WBC 9.32   HGB 14.9   HCT 45.0        CMP:   Recent Labs   Lab 02/07/19 2059   *   K 4.6      CO2 18*   *   BUN 29*   CREATININE 1.3   CALCIUM 9.2   ANIONGAP 14   EGFRNONAA 39*     Urine Culture: No results for input(s): LABURIN in the last 48 hours.  Urine Studies: No results for input(s): COLORU, APPEARANCEUA, PHUR, SPECGRAV, PROTEINUA, GLUCUA, KETONESU, BILIRUBINUA, OCCULTUA, NITRITE, UROBILINOGEN, LEUKOCYTESUR, RBCUA, WBCUA, BACTERIA, SQUAMEPITHEL, HYALINECASTS in the last 48 hours.    Invalid input(s): WRIGHTSUR  All pertinent labs within the past 24 hours have been reviewed.    Significant Imaging: I have reviewed all pertinent imaging results/findings within the past 24 hours.   Imaging Results          X-Ray Chest PA And Lateral (Final result)  Result time 02/07/19 19:17:26    Final result by Leesa Archer MD (02/07/19 19:17:26)                 Impression:      Blunting of the costophrenic angles right greater than left, which may suggest pleural effusion and/or pleural thickening.    Prominent interstitial lung markings, which may suggest interstitial edema or interstitial infiltrates.      Electronically signed by: Leesa Archer  Date:    02/07/2019  Time:    19:17             Narrative:    EXAMINATION:  XR CHEST PA AND LATERAL    CLINICAL HISTORY:  Cough    TECHNIQUE:  PA and lateral views of the chest were performed.    COMPARISON:  None    FINDINGS:  PA and lateral chest radiograph demonstrates a mildly enlarged cardiac silhouette.  Dense vascular calcifications are present at the aortic knob.  There are mild prominent interstitial lung markings.  There is blunting of the right greater than left costophrenic angle.  No pneumothorax is detected.  Spondylitic changes are seen.                                 Assessment/Plan:     * Acute exacerbation of CHF (congestive heart failure)    - Ms.  Cecelia Currie is admitted to inpatient status  - she has elevated BNP of 3059  - s/p lasix 20 mg iv in ED   - strict I&Os  - daily weights  - last ECHO was 6/8/18 with EF 25% and increase PA pressure of 49.22, she has severe Mitral and tricuspid valve regurg   - repeat ECHO ordered      Influenza A    - tamiflu started in ED   - PRN supportive treatment     Elevated troponin    - trend, with CK-MB & EKGs   - assess A1C & lipid panel  - Monitor on tele     TIMOTEO (acute kidney injury)    - Cr appears to generally be ~0.9  - Cr currently 1.3   - no IV fluids now 2/2 CHF exacerbation  - TIMOTEO studies sent      Essential hypertension    - most recent blood pressure reading BP: (!) 132/59   - continue home meds  - hydralazine PRN for SBP > 170  - monitor        CAD (coronary artery disease)    - patient not currently on ASA or statin  - lipid panel pending for AM       Memory loss    - delirium precautions         VTE Risk Mitigation (From admission, onward)        Ordered     heparin (porcine) injection 5,000 Units  Every 12 hours      02/08/19 0106     Place sequential compression device  Until discontinued      02/08/19 0106     Place ASHUTOSH hose  Until discontinued      02/08/19 0106     IP VTE HIGH RISK PATIENT  Once      02/08/19 0106     Place sequential compression device  Until discontinued      02/08/19 0106             Alma Rosa Avila PA-C  Department of Hospital Medicine   [facility]

## 2019-02-08 NOTE — PLAN OF CARE
Problem: Adult Inpatient Plan of Care  Goal: Plan of Care Review  Outcome: Ongoing (interventions implemented as appropriate)  Patient has a dx of Influenza A. AAOx4, on RA, VSS, afebrile. No c/o pain or n/v. Up ad johnathan to the bathroom.

## 2019-02-08 NOTE — NURSING
IV found in hand, with cath tip intact. New IV inserted without difficulty. Will continue to assess.

## 2019-02-08 NOTE — SUBJECTIVE & OBJECTIVE
"Past Medical History:   Diagnosis Date    Aortic stenosis     Breast cancer 1987    Glaucoma     Glaucoma     Heart disease     Heart failure     HTN (hypertension)     Morbid obesity     MADYSON (obstructive sleep apnea)     Osteoporosis     Tricuspid regurgitation     Vitamin D deficiency     Xerosis of skin        Past Surgical History:   Procedure Laterality Date    CATARACT EXTRACTION      MASTECTOMY Right        Review of patient's allergies indicates:  No Known Allergies    No current facility-administered medications on file prior to encounter.      Current Outpatient Medications on File Prior to Encounter   Medication Sig    alendronate (FOSAMAX) 70 MG tablet Take 1 tablet (70 mg total) by mouth every 7 days.    ammonium lactate 12 % Crea Apply topically twice a day. Rub in well    BD SAFETY-AGUS TUBERCULIN 1 mL 25 gauge x 5/8" Syrg USE FOR B 12 INJECTIONS    cyanocobalamin 1,000 mcg/mL injection INJECT 1ML INTO THE MUSCLE Q 30 DAYS    donepezil (ARICEPT) 5 MG tablet Take 1 tablet (5 mg total) by mouth once daily.    emollient combination no.40 Lotn apply to her skin bid    enalapril (VASOTEC) 2.5 MG tablet Take 1 tablet (2.5 mg total) by mouth once daily.    ergocalciferol (ERGOCALCIFEROL) 50,000 unit Cap Take 1 capsule (50,000 Units total) by mouth every 7 days.    furosemide (LASIX) 20 MG tablet TAKE 1 TABLET BY MOUTH TWICE DAILY    latanoprost 0.005 % ophthalmic solution INSTILL 1 DROP IN BOTH EYES DAILY    mupirocin (BACTROBAN) 2 % ointment Apply to affected areas of body TID x 1-2 weeks.    needle, disp, 25 gauge 25 gauge x 1" Ndle For use with B12 injections.     Family History     Problem Relation (Age of Onset)    Diabetes Mother, Brother, Brother    Heart disease Paternal Grandmother    Heart failure Mother, Brother    Hypertension Mother        Tobacco Use    Smoking status: Never Smoker    Smokeless tobacco: Never Used   Substance and Sexual Activity    Alcohol use: Not " on file    Drug use: No    Sexual activity: Not on file     Review of Systems   Constitutional: Positive for chills, diaphoresis and fever. Negative for activity change, appetite change and fatigue.   HENT: Negative for congestion, ear pain, postnasal drip, rhinorrhea, sinus pressure, sore throat and trouble swallowing.    Respiratory: Positive for cough, shortness of breath and wheezing.    Cardiovascular: Negative for chest pain and palpitations.   Gastrointestinal: Negative for abdominal pain, diarrhea, nausea and vomiting.   Genitourinary: Negative for dysuria, flank pain, frequency, hematuria and urgency.   Skin: Negative for color change, pallor, rash and wound.   Neurological: Negative for dizziness, weakness, light-headedness and headaches.     Objective:     Vital Signs (Most Recent):  Temp: 98.5 °F (36.9 °C) (02/08/19 0400)  Pulse: 72 (02/08/19 0400)  Resp: 20 (02/08/19 0400)  BP: 106/61 (02/08/19 0400)  SpO2: 96 % (02/08/19 0400) Vital Signs (24h Range):  Temp:  [98 °F (36.7 °C)-98.6 °F (37 °C)] 98.5 °F (36.9 °C)  Pulse:  [62-78] 72  Resp:  [16-24] 20  SpO2:  [95 %-100 %] 96 %  BP: (106-135)/(58-75) 106/61     Weight: 74.3 kg (163 lb 12.8 oz)  Body mass index is 31.99 kg/m².    Physical Exam   Constitutional: She is oriented to person, place, and time. She appears well-developed and well-nourished. No distress.   HENT:   Head: Normocephalic and atraumatic.   Eyes: Conjunctivae and EOM are normal. Pupils are equal, round, and reactive to light. No scleral icterus.   Neck: Normal range of motion. Neck supple. No tracheal deviation present.   Cardiovascular: Normal rate, regular rhythm, normal heart sounds and intact distal pulses. Exam reveals no gallop and no friction rub.   No murmur heard.  Pulmonary/Chest: Effort normal. No stridor. No respiratory distress. She has wheezes (diffuse). She has no rales.   Decreased breath sounds in right lower lobe.    Abdominal: Soft. Bowel sounds are normal. She  exhibits no distension. There is no tenderness. There is no guarding.   Musculoskeletal: Normal range of motion. She exhibits no deformity.   Neurological: She is alert and oriented to person, place, and time.   Skin: Skin is warm and dry. She is not diaphoretic.   Psychiatric: She has a normal mood and affect. Her behavior is normal. Judgment and thought content normal.   Nursing note and vitals reviewed.        CRANIAL NERVES     CN III, IV, VI   Pupils are equal, round, and reactive to light.  Extraocular motions are normal.        Significant Labs:   BMP:   Recent Labs   Lab 02/07/19 2059 02/08/19  0238   *  --    *  --    K 4.6  --      --    CO2 18*  --    BUN 29*  --    CREATININE 1.3  --    CALCIUM 9.2  --    MG  --  2.1     CBC:   Recent Labs   Lab 02/07/19 2059   WBC 9.32   HGB 14.9   HCT 45.0        CMP:   Recent Labs   Lab 02/07/19 2059   *   K 4.6      CO2 18*   *   BUN 29*   CREATININE 1.3   CALCIUM 9.2   ANIONGAP 14   EGFRNONAA 39*     Urine Culture: No results for input(s): LABURIN in the last 48 hours.  Urine Studies: No results for input(s): COLORU, APPEARANCEUA, PHUR, SPECGRAV, PROTEINUA, GLUCUA, KETONESU, BILIRUBINUA, OCCULTUA, NITRITE, UROBILINOGEN, LEUKOCYTESUR, RBCUA, WBCUA, BACTERIA, SQUAMEPITHEL, HYALINECASTS in the last 48 hours.    Invalid input(s): WRIGHTSUR  All pertinent labs within the past 24 hours have been reviewed.    Significant Imaging: I have reviewed all pertinent imaging results/findings within the past 24 hours.   Imaging Results          X-Ray Chest PA And Lateral (Final result)  Result time 02/07/19 19:17:26    Final result by Leesa Archer MD (02/07/19 19:17:26)                 Impression:      Blunting of the costophrenic angles right greater than left, which may suggest pleural effusion and/or pleural thickening.    Prominent interstitial lung markings, which may suggest interstitial edema or interstitial  infiltrates.      Electronically signed by: Leesa Archer  Date:    02/07/2019  Time:    19:17             Narrative:    EXAMINATION:  XR CHEST PA AND LATERAL    CLINICAL HISTORY:  Cough    TECHNIQUE:  PA and lateral views of the chest were performed.    COMPARISON:  None    FINDINGS:  PA and lateral chest radiograph demonstrates a mildly enlarged cardiac silhouette.  Dense vascular calcifications are present at the aortic knob.  There are mild prominent interstitial lung markings.  There is blunting of the right greater than left costophrenic angle.  No pneumothorax is detected.  Spondylitic changes are seen.

## 2019-02-08 NOTE — ED NOTES
Pt rounding, pt made aware of the wait time, pt reports that she is in no pain, pt vitals rechecked for temp.

## 2019-02-08 NOTE — HPI
Mr. Cecelia Currie is a 78 y.o. female, with PMH of HTN, systolic and diastolic CHF, CAD, HLD , and dementia, who presented to [facility] ED on 2/7/19 2/2 SOB. Associated symptoms included fatigue, and non-productive cough x 2 days, and fever of 102.3F today. The patient was evaluated in the ED, and was positive for influenza A, and did also have elevated BNP of 3059. A troponin was elevated without new T wave changes, but with persistence of lateral T wave inversions. She was treated with tamiflu, lasix and ASA in the ED, and was admitted to inpatient status.

## 2019-02-08 NOTE — PLAN OF CARE
CM met with pt for initial discharge planning assessment.    Pt confirms her PCP is  at [facility] and her pharmacy of choice is [facility].    Pt lives with her adult daughter, states she is independent and uses no DME.    Pt denies hx of any mental issues.    CM anticipates no needs or barriers for discharge.     02/08/19 1249   Discharge Assessment   Assessment Type Discharge Planning Assessment   Confirmed/corrected address and phone number on facesheet? Yes   Assessment information obtained from? Patient;Medical Record   Expected Length of Stay (days) 2   Communicated expected length of stay with patient/caregiver yes   Prior to hospitilization cognitive status: Alert/Oriented   Prior to hospitalization functional status: Independent   Current cognitive status: Alert/Oriented   Current Functional Status: Independent   Lives With child(jennifer), adult   Able to Return to Prior Arrangements yes   Is patient able to care for self after discharge? Yes   Patient's perception of discharge disposition home or selfcare   Readmission Within the Last 30 Days no previous admission in last 30 days   Patient currently being followed by outpatient case management? No   Patient currently receives any other outside agency services? No   Equipment Currently Used at Home none   Do you have any problems affording any of your prescribed medications? No   Is the patient taking medications as prescribed? yes   Does the patient have transportation home? Yes   Transportation Anticipated family or friend will provide   Does the patient receive services at the [facility]mad[facility]? No   Discharge Plan A Home   Discharge Plan B Home   DME Needed Upon Discharge  none   Patient/Family in Agreement with Plan yes

## 2019-02-08 NOTE — NURSING
Pt in room, blood noted to sheets, upon assessment IV noted on the bed. Pressure dressing placed. MD notified. Will continue to monitor.

## 2019-02-08 NOTE — ASSESSMENT & PLAN NOTE
- Ms. Cecelia Currie is admitted to inpatient status  - she has elevated BNP of 3059  - s/p lasix 20 mg iv in ED   - strict I&Os  - daily weights  - last ECHO was 6/8/18 with EF 25% and increase PA pressure of 49.22, she has severe Mitral and tricuspid valve regurg   - repeat ECHO ordered

## 2019-02-08 NOTE — ASSESSMENT & PLAN NOTE
- Cr appears to generally be ~0.9  - Cr currently 1.3   - no IV fluids now 2/2 CHF exacerbation  - TIMOTEO studies sent

## 2019-02-08 NOTE — ASSESSMENT & PLAN NOTE
- most recent blood pressure reading BP: (!) 132/59   - continue home meds  - hydralazine PRN for SBP > 170  - monitor

## 2019-02-09 VITALS
RESPIRATION RATE: 18 BRPM | TEMPERATURE: 98 F | BODY MASS INDEX: 31.34 KG/M2 | DIASTOLIC BLOOD PRESSURE: 73 MMHG | HEIGHT: 60 IN | SYSTOLIC BLOOD PRESSURE: 123 MMHG | OXYGEN SATURATION: 99 % | HEART RATE: 65 BPM | WEIGHT: 159.63 LBS

## 2019-02-09 PROBLEM — I50.23 ACUTE ON CHRONIC SYSTOLIC CONGESTIVE HEART FAILURE: Status: ACTIVE | Noted: 2019-02-07

## 2019-02-09 LAB
ANION GAP SERPL CALC-SCNC: 9 MMOL/L
BASOPHILS # BLD AUTO: 0.02 K/UL
BASOPHILS NFR BLD: 0.4 %
BUN SERPL-MCNC: 33 MG/DL
CALCIUM SERPL-MCNC: 8.5 MG/DL
CHLORIDE SERPL-SCNC: 103 MMOL/L
CK MB SERPL-MCNC: 1 NG/ML
CK MB SERPL-MCNC: 1.2 NG/ML
CK MB SERPL-RTO: 1 %
CK MB SERPL-RTO: 1.1 %
CK SERPL-CCNC: 103 U/L
CK SERPL-CCNC: 106 U/L
CO2 SERPL-SCNC: 23 MMOL/L
CREAT SERPL-MCNC: 1.1 MG/DL
DIFFERENTIAL METHOD: ABNORMAL
EOSINOPHIL # BLD AUTO: 0.1 K/UL
EOSINOPHIL NFR BLD: 1.1 %
ERYTHROCYTE [DISTWIDTH] IN BLOOD BY AUTOMATED COUNT: 14.3 %
EST. GFR  (AFRICAN AMERICAN): 55 ML/MIN/1.73 M^2
EST. GFR  (NON AFRICAN AMERICAN): 48 ML/MIN/1.73 M^2
GLUCOSE SERPL-MCNC: 95 MG/DL
HCT VFR BLD AUTO: 42.6 %
HGB BLD-MCNC: 14 G/DL
LYMPHOCYTES # BLD AUTO: 1.2 K/UL
LYMPHOCYTES NFR BLD: 21.4 %
MAGNESIUM SERPL-MCNC: 2.1 MG/DL
MCH RBC QN AUTO: 29.5 PG
MCHC RBC AUTO-ENTMCNC: 32.9 G/DL
MCV RBC AUTO: 90 FL
MONOCYTES # BLD AUTO: 1 K/UL
MONOCYTES NFR BLD: 17.8 %
NEUTROPHILS # BLD AUTO: 3.3 K/UL
NEUTROPHILS NFR BLD: 59.1 %
PLATELET # BLD AUTO: 253 K/UL
PMV BLD AUTO: 9.9 FL
POTASSIUM SERPL-SCNC: 3.9 MMOL/L
RBC # BLD AUTO: 4.75 M/UL
SODIUM SERPL-SCNC: 135 MMOL/L
WBC # BLD AUTO: 5.52 K/UL

## 2019-02-09 PROCEDURE — 25000003 PHARM REV CODE 250: Performed by: EMERGENCY MEDICINE

## 2019-02-09 PROCEDURE — 94761 N-INVAS EAR/PLS OXIMETRY MLT: CPT

## 2019-02-09 PROCEDURE — 90670 PCV13 VACCINE IM: CPT | Performed by: HOSPITALIST

## 2019-02-09 PROCEDURE — 63600175 PHARM REV CODE 636 W HCPCS: Performed by: PHYSICIAN ASSISTANT

## 2019-02-09 PROCEDURE — G0009 ADMIN PNEUMOCOCCAL VACCINE: HCPCS | Performed by: HOSPITALIST

## 2019-02-09 PROCEDURE — 99239 HOSP IP/OBS DSCHRG MGMT >30: CPT | Mod: ,,, | Performed by: HOSPITALIST

## 2019-02-09 PROCEDURE — 80048 BASIC METABOLIC PNL TOTAL CA: CPT

## 2019-02-09 PROCEDURE — 25000003 PHARM REV CODE 250: Performed by: PHYSICIAN ASSISTANT

## 2019-02-09 PROCEDURE — 90471 IMMUNIZATION ADMIN: CPT | Performed by: HOSPITALIST

## 2019-02-09 PROCEDURE — 36415 COLL VENOUS BLD VENIPUNCTURE: CPT

## 2019-02-09 PROCEDURE — 82553 CREATINE MB FRACTION: CPT

## 2019-02-09 PROCEDURE — 82550 ASSAY OF CK (CPK): CPT

## 2019-02-09 PROCEDURE — A4216 STERILE WATER/SALINE, 10 ML: HCPCS | Performed by: EMERGENCY MEDICINE

## 2019-02-09 PROCEDURE — 83735 ASSAY OF MAGNESIUM: CPT

## 2019-02-09 PROCEDURE — 85025 COMPLETE CBC W/AUTO DIFF WBC: CPT

## 2019-02-09 PROCEDURE — 99239 PR HOSPITAL DISCHARGE DAY,>30 MIN: ICD-10-PCS | Mod: ,,, | Performed by: HOSPITALIST

## 2019-02-09 PROCEDURE — 63600175 PHARM REV CODE 636 W HCPCS: Performed by: HOSPITALIST

## 2019-02-09 RX ORDER — ASPIRIN 81 MG/1
81 TABLET ORAL DAILY
Refills: 0 | COMMUNITY
Start: 2019-02-09 | End: 2019-02-19 | Stop reason: SDUPTHER

## 2019-02-09 RX ORDER — OSELTAMIVIR PHOSPHATE 30 MG/1
30 CAPSULE ORAL 2 TIMES DAILY
Qty: 8 CAPSULE | Refills: 0 | Status: SHIPPED | OUTPATIENT
Start: 2019-02-09 | End: 2019-02-13

## 2019-02-09 RX ADMIN — Medication 3 ML: at 01:02

## 2019-02-09 RX ADMIN — FUROSEMIDE 40 MG: 10 INJECTION, SOLUTION INTRAVENOUS at 09:02

## 2019-02-09 RX ADMIN — LATANOPROST 1 DROP: 50 SOLUTION OPHTHALMIC at 09:02

## 2019-02-09 RX ADMIN — DONEPEZIL HYDROCHLORIDE 5 MG: 5 TABLET, FILM COATED ORAL at 09:02

## 2019-02-09 RX ADMIN — OSELTAMIVIR PHOSPHATE 30 MG: 6 POWDER, FOR SUSPENSION ORAL at 09:02

## 2019-02-09 RX ADMIN — PNEUMOCOCCAL 13-VALENT CONJUGATE VACCINE 0.5 ML: 2.2; 2.2; 2.2; 2.2; 2.2; 4.4; 2.2; 2.2; 2.2; 2.2; 2.2; 2.2; 2.2 INJECTION, SUSPENSION INTRAMUSCULAR at 01:02

## 2019-02-09 RX ADMIN — HEPARIN SODIUM 5000 UNITS: 5000 INJECTION, SOLUTION INTRAVENOUS; SUBCUTANEOUS at 09:02

## 2019-02-09 NOTE — ASSESSMENT & PLAN NOTE
-Cr appears to generally be ~0.9  -On admit Cr was 1.3.  -As expected Cr normalized to 1.1 with diuresis.

## 2019-02-09 NOTE — ASSESSMENT & PLAN NOTE
-patient not currently on ASA or statin  -LDL at goal.  -Will start baby aspirin at discharge.  -No beta blocker due to sinus bradycardia  -Continue enalapril

## 2019-02-09 NOTE — ASSESSMENT & PLAN NOTE
-A1c was 5.9 so she is not diabetic  -LDL was 83 so lipids are at goal.  -She had no chest pain  -Troponins were minimally elevated consistent with history of systolic chf.  She did not have acute MI.

## 2019-02-09 NOTE — PLAN OF CARE
Problem: Adult Inpatient Plan of Care  Goal: Plan of Care Review  Outcome: Ongoing (interventions implemented as appropriate)  Patient has a dx of acute CHF exacerbation with Influenza A. AAOx4, on RA, VSS, afebrile. No c/o pain or n/v. Up ad johnathan to the bathroom.

## 2019-02-09 NOTE — DISCHARGE SUMMARY
[facility]  Hospital Medicine  Discharge Summary      Patient Name: Cecelia Currie  MRN: [id number]  Admission Date: 2/7/2019  Hospital Length of Stay: 2 days  Discharge Date and Time:  02/09/2019 10:06 AM  Attending Physician: Abdiaziz Gomez MD   Discharging Provider: Abdiaziz Gomez MD  Primary Care Provider: Margaret Rebolledo MD      HPI:   Mr. Cecelia Currie is a 78 y.o. female, with PMH of HTN, systolic and diastolic CHF, CAD, HLD , and dementia, who presented to [facility] ED on 2/7/19 2/2 SOB. Associated symptoms included fatigue, and non-productive cough x 2 days, and fever of 102.3F today. The patient was evaluated in the ED, and was positive for influenza A, and did also have elevated BNP of 3059. A troponin was elevated without new T wave changes, but with persistence of lateral T wave inversions. She was treated with tamiflu, lasix and ASA in the ED, and was admitted to inpatient status.     Hospital Course By Problem:   * Acute on chronic systolic congestive heart failure    - Ms. Cecelia Currie wass admitted to inpatient status  -She was mildly fluid overloaded, most likely due to acute influenza infection  -She has been diuresed with IV lasix and is net negative 1.2 liters.  -She will continue on her home enalapril  -We will not prescribe a beta-blocker due to sinus bradycardia.  -Echocardiogram has been performed in last 6 months so no need for repeat at this time.  -She will continue on her cardiac diet and follow up with her pcp within 1 week.     Influenza A     tamiflu started in ED   -Will prescribe additional 4 day course to be completed at home.       CAD (coronary artery disease)    -patient not currently on ASA or statin  -LDL at goal.  -Will start baby aspirin at discharge.  -No beta blocker due to sinus bradycardia  -Continue enalapril     TIMOTEO (acute kidney injury)    -Cr appears to generally be ~0.9  -On admit Cr was 1.3.  -As expected Cr normalized  to 1.1 with diuresis.       Elevated troponin    -A1c was 5.9 so she is not diabetic  -LDL was 83 so lipids are at goal.  -She had no chest pain  -Troponins were minimally elevated consistent with history of systolic chf.  She did not have acute MI.     Essential hypertension    - most recent blood pressure reading BP: 123/73   - continue home enalapril       Memory loss    - delirium precautions while in house  -Continue donepezil at home.         Final Active Diagnoses:    Diagnosis Date Noted POA    PRINCIPAL PROBLEM:  Acute on chronic systolic congestive heart failure [I50.23] 02/07/2019 Yes    Influenza A [J10.1] 02/07/2019 Yes    Elevated troponin [R74.8] 02/07/2019 Yes    TIMOTEO (acute kidney injury) [N17.9] 02/07/2019 Yes    CAD (coronary artery disease) [I25.10] 02/07/2019 Yes    Essential hypertension [I10] 06/08/2018 Yes    Memory loss [R41.3] 04/02/2018 Yes      Problems Resolved During this Admission:       Discharged Condition: good    Disposition: Home or Self Care    Follow Up:  Follow-up Information     Margaret Rebolledo MD In 1 week.    Specialty:  Family Medicine  Contact information:  [address] [facility] [address]  [phone]                 Patient Instructions:      Diet Cardiac     Notify your health care provider if you experience any of the following:  increased confusion or weakness     Notify your health care provider if you experience any of the following:  persistent dizziness, light-headedness, or visual disturbances     Notify your health care provider if you experience any of the following:  worsening rash     Notify your health care provider if you experience any of the following:  severe persistent headache     Notify your health care provider if you experience any of the following:  difficulty breathing or increased cough     Notify your health care provider if you experience any of the following:  severe uncontrolled pain     Notify your health care provider if  "you experience any of the following:  temperature >100.4     Notify your health care provider if you experience any of the following:  persistent nausea and vomiting or diarrhea     Activity as tolerated       Significant Diagnostic Studies: Labs:   BMP:   Recent Labs   Lab 02/07/19 2059 02/08/19 0238 02/08/19 0429 02/09/19 0318   *  --  102 95   *  --  136 135*   K 4.6  --  3.8 3.9     --  103 103   CO2 18*  --  21* 23   BUN 29*  --  30* 33*   CREATININE 1.3  --  1.4 1.1   CALCIUM 9.2  --  9.1 8.5*   MG  --  2.1  --  2.1   , CMP   Recent Labs   Lab 02/07/19 2059 02/08/19 0429 02/09/19 0318   * 136 135*   K 4.6 3.8 3.9    103 103   CO2 18* 21* 23   * 102 95   BUN 29* 30* 33*   CREATININE 1.3 1.4 1.1   CALCIUM 9.2 9.1 8.5*   ANIONGAP 14 12 9   ESTGFRAFRICA 45* 41* 55*   EGFRNONAA 39* 36* 48*   , CBC   Recent Labs   Lab 02/07/19 2059 02/08/19 0429 02/09/19 0318   WBC 9.32 6.71 5.52   HGB 14.9 14.4 14.0   HCT 45.0 43.9 42.6    249 253   , Lipid Panel   Lab Results   Component Value Date    CHOL 141 02/08/2019    HDL 44 02/08/2019    LDLCALC 83.6 02/08/2019    TRIG 67 02/08/2019    CHOLHDL 31.2 02/08/2019    and Troponin   Recent Labs   Lab 02/08/19  0829   TROPONINI 0.044*     Medications:   Cecelia Currie   Home Medication Instructions AJAY:[id number]    Printed on:02/09/19 1007   Medication Information                      alendronate (FOSAMAX) 70 MG tablet  Take 1 tablet (70 mg total) by mouth every 7 days.             ammonium lactate 12 % Crea  Apply topically twice a day. Rub in well             aspirin (ECOTRIN) 81 MG EC tablet  Take 1 tablet (81 mg total) by mouth once daily.             BD SAFETY-AGUS TUBERCULIN 1 mL 25 gauge x 5/8" Syrg  USE FOR B 12 INJECTIONS             cyanocobalamin 1,000 mcg/mL injection  INJECT 1ML INTO THE MUSCLE Q 30 DAYS             donepezil (ARICEPT) 5 MG tablet  Take 1 tablet (5 mg total) by mouth once daily.           " "  emollient combination no.40 Lotn  apply to her skin bid             enalapril (VASOTEC) 2.5 MG tablet  Take 1 tablet (2.5 mg total) by mouth once daily.             ergocalciferol (ERGOCALCIFEROL) 50,000 unit Cap  Take 1 capsule (50,000 Units total) by mouth every 7 days.             furosemide (LASIX) 20 MG tablet  TAKE 1 TABLET BY MOUTH TWICE DAILY             latanoprost 0.005 % ophthalmic solution  INSTILL 1 DROP IN BOTH EYES DAILY             needle, disp, 25 gauge 25 gauge x 1" Ndle  For use with B12 injections.             oseltamivir (TAMIFLU) 30 MG capsule  Take 1 capsule (30 mg total) by mouth 2 (two) times daily. for 4 days                   Time spent on the discharge of patient: 35 minutes  Patient was seen and examined on the date of discharge and determined to be suitable for discharge.         Abdiaziz Gomez MD  Department of Hospital Medicine  [facility]  "

## 2019-02-09 NOTE — ASSESSMENT & PLAN NOTE
- Ms. Cecelia Currie wass admitted to inpatient status  -She was mildly fluid overloaded, most likely due to acute influenza infection  -She has been diuresed with IV lasix and is net negative 1.2 liters.  -She will continue on her home enalapril  -We will not prescribe a beta-blocker due to sinus bradycardia.  -Echocardiogram has been performed in last 6 months so no need for repeat at this time.  -She will continue on her cardiac diet and follow up with her pcp within 1 week.

## 2019-02-12 ENCOUNTER — PATIENT OUTREACH (OUTPATIENT)
Dept: ADMINISTRATIVE | Facility: CLINIC | Age: 79
End: 2019-02-12

## 2019-02-12 NOTE — PATIENT INSTRUCTIONS
Preventing Common Respiratory Infections  Respiratory infections such as colds and influenza (the flu) are common in winter. These infections are often caused by viruses. They may share some symptoms, but not all respiratory infections are the same. Some make you more sick than others. You can take steps to prevent common respiratory infections. And if you get sick, you can take care of yourself to keep the infection from getting worse.    What is a cold?  · Symptoms include runny nose, coughing and sneezing, and sore throat. Cold symptoms tend to be milder than flu symptoms.  · Symptoms tend to come on slowly. They last for a few days to about a week.  · With a cold, you can still do most of the things you usually do.  What is the flu?  · Symptoms include fever, headache, fatigue, cough, sore throat, runny nose, and muscle aches. Children may have upset stomach and vomiting, but adults usually dont.  · Symptoms tend to come on quickly. Some, such as fatigue and cough, can last a few weeks.  · With the flu, you may feel worn out and not able to do normal activities.  · Its most likely NOT the flu if an adult has vomiting or diarrhea for a day or two. This so-called stomach flu is probably a GI (gastrointestinal) infection.  When the infection gets worse  Without proper care, a respiratory infection can get worse. It can lead to serious complications and death. If you arent getting better, call your healthcare provider. Complications can include:  · Bronchitis (infection of the airways that leads to shortness of breath and coughing up thick yellow or green mucus)  · Pneumonia (infection of the lungs in which fluid and mucus settle in the lungs, making breathing difficult)  · Worsening of chronic conditions such as heart failure, chronic lung disease, asthma, or diabetes  · Severe dehydration (loss of fluids)  · Sinus problems  · Ear infections   Get a flu vaccine  A flu vaccine protects you from influenza  (but not other colds or infections). Get a vaccine each fall, before flu season starts. This can be done at a clinic, healthcare providers office, drugstore, senior center, or through your workplace.  Get pneumococcal vaccines  Pneumonia can be a complication of influenza. There are 2 pneumococcal pneumonia vaccines that protect against many types of pneumonia. Talk with your healthcare provider about these important vaccines.   Keep germs from spreading  No one likes getting sick. To protect yourself and others from cold and flu germs:  · Wash your hands often. Use alcohol-based hand  when you dont have access to soap and water.  · Dont touch your eyes, nose, and mouth. This may help you keep germs out of your body.  · Try to avoid people with respiratory infections. You may want to stay out of crowds during flu season (winter).  · Ask your healthcare provider if you should get a pneumonia vaccination.  How to wash your hands  · Use warm water and plenty of soap. Work up a good lather.  · Clean your whole hand, under your nails, between your fingers, and up your wrists. Wash for at least 15 to 20 seconds. Dont just wipe--rub well.  · Rinse. Let the water run down your fingertips, not up your wrists.  · In a public restroom, use a paper towel to turn off the faucet and open the door.   Date Last Reviewed: 12/1/2016  © 8580-4899 [employer]. [address] [place] [address], [place] [address]. All rights reserved. This information is not intended as a substitute for professional medical care. Always follow your healthcare professional's instructions.        Discharge Instructions for Heart Failure  The heart is a muscle that pumps oxygen-rich blood to all parts of the body. When you have heart failure, the heart is not able to pump as well as it should. Blood and fluid may back up into the lungs (congestive heart failure), and some parts of the body dont get enough oxygen-rich blood to work  normally. These problems lead to the symptoms of heart failure. Heart failure can occur due to an injury to the heart or from natural processes.  You can control symptoms of heart failure with some lifestyle changes and by following your doctor's advice.  Home care  Activity  Ask your healthcare provider about an exercise program. You can benefit from simple activities such as walking or gardening. Exercising most days of the week can make you feel better. Don't be discouraged if your progress is slow at first. Rest as needed. Stop activity if you develop symptoms such as chest pain, lightheadedness, or significant shortness of breath. Find activities that you enjoy, such as brisk walking, dancing, swimming, or gardening. These will help you stay active and strengthen your heart.  Diet  Follow a heart healthy diet. And make sure to limit the salt (sodium) in your diet. Salt causes your body to hold water. This makes your heart work harder as there is more fluid for the heart to pump. Limit your salt by doing the following:  · Limit canned, dried, packaged, and fast foods.  · Don't add salt to your food.  · Season foods with herbs instead of salt.  · Watch how much liquids you drink. Drinking too much can make heart failure worse. Talk with your health care provider about how much you should drink each day.  · Limit the amount of alcohol you drink. It may harm your heart. Women should have no more than 1 drink a day and men should have no more than 2.  · When you eat out, request that your meals have no added salt.  Tobacco  If you smoke, it's very important to quit. Smoking increases your chances of having a heart attack by harming the blood vessels that provide oxygen to your heart. This makes heart failure worse. Quitting smoking is the number one thing you can do to improve your health. Enroll in a stop-smoking program to improve your chances of success. Talk with your healthcare provider about medicines or  nicotine replacement therapy to help you quit smoking. Ask your healthcare provider about smoking cessation support groups.  Medicine  Take your medicines exactly as prescribed. Learn the names and purpose of each of your medicines. Keep an accurate medicine list and current dosages with you at all times. Don't skip doses. If you miss a dose of your medicine, take it as soon as you remember. If you miss a dose and it's almost time for your next dose, just wait and take your next dose at the normal time. Don't take a double dose. If you are unsure, call your doctor's office. Make sure not to mix up your medicines or forget what you've taken the same day.  Weight monitoring  Weigh yourself every day. A sudden weight gain can mean your heart failure is getting worse. Weigh yourself at the same time of day and in the same kind of clothes. Ideally, weigh yourself first thing in the morning after you empty your bladder, but before you eat breakfast. Your healthcare provider will show you how to track your weight. He or she will also discuss with you when you should call if you have a sudden, unexpected increase in your weight.  In general, your healthcare provider may ask you to report if your weight goes up by more than 2 pounds in 1 day,  5 pounds in 1 week, or whatever weight gain you were told by your doctor. This is a sign that you are retaining more fluid than you should be. Clues to weight gain include checking your ankles for swelling, or noticing you are short of breath when you lie down.  Follow-up care  Make a follow-up appointment as directed. Depending on the type and severity of heart failure you have, you may need follow-up as early as 7 days from hospital discharge. Keep appointments for checkups and lab tests that are needed to check your medicines and condition.  Recognize that your health and even survival depend on your following medical recommendations.  Symptoms  Heart failure can cause a variety of  symptoms, including:  · Shortness of breath  · Trouble breathing at night, especially when you lie down  · Swelling in the legs and feet or in the belly (abdomen)  · Becoming easily fatigued  · Irregular or rapid heartbeat  · Weakness or lightheadedness  · Swelling of the neck veins  It is important to know what to do if symptoms get worse or if you develop signs of worsening heart failure.     When to see your healthcare provider  Call your doctor right away if you have any of these signs of worsening heart failure:  · Sudden weight gain (more than 2 pounds in 1 day or 5 pounds in 1 week, or whatever weight gain you were told to report by your doctor)  · Trouble breathing not related to being active  · New or increased swelling of your legs or ankles  · Swelling or pain in your abdomen  · Breathing trouble at night (waking up short of breath, needing more pillows to breathe)  · Frequent coughing that doesn't go away  · Feeling much more tired than usual  Call 911  Call 911 right away if you have:  · Severe shortness of breath, such that you can't catch your breath even while resting  · Severe chest pain that does not resolve with rest or nitroglycerin  · Pink, foamy mucus with cough and shortness of breath  · A continuous rapid or irregular heartbeat  · Passing out or fainting  · Stroke symptoms such as sudden numbness or weakness on one side of your face, arm, or leg or sudden confusion, trouble speaking or vision changes   Date Last Reviewed: 3/21/2016  © 5411-3269 [employer]. [address] [place] [address], [place] [address]. All rights reserved. This information is not intended as a substitute for professional medical care. Always follow your healthcare professional's instructions.        Taking a Diuretic  Your healthcare provider has prescribed a diuretic, or water pill, to help your body get rid of excess water and salt and maintain appropriate fluid balance. Taking your diuretic can help you feel  better, breathe better, move more easily, and have more energy.     Take your medication early in the day at the same time each day.      The name of my diuretic is:     ________________________________________   Medicine tips  · Read the fact sheet that comes with your medicine. It tells you when and how to take it. Ask for a medicine sheet if you dont get one.  · If you take 2 or more doses each day, take the last one before dinner if you can. That way youll get up fewer times during the night to go to the bathroom. However, make sure you have enough time between doses during the day.   · If you miss a dose, take it as soon as you remember. If it is almost time for the next dose, skip the missed dose. Do not take a double dose.  · If you miss 2 or more consecutive doses, call your healthcare provider. You may be at risk for fluid buildup.  For your safety  · Follow your doctors guidelines for potassium intake. You may need to take a potassium supplement. Or, you may need to avoid potassium supplements, salt substitutes with potassium, or large amounts of high-potassium foods (such as bananas, potatoes, broccoli, and milk). Recommendations for potassium will depend on the type of diuretic you are prescribed along with your kidney function and other factors. Your healthcare provider will likely want to check your potassium level regularly while you are taking this medicine.  · Talk to your healthcare provider about whether it is safe for you to drink alcohol while taking this medicine.  · Get up slowly when you are sitting or lying down. This helps prevent dizziness and falls due to dizziness.  · Ask your healthcare provider or pharmacist before you take any other prescription or nonprescription medicine or herbal supplements. Some of them may interact with your diuretic and keep it from working correctly.  · Limit exposure to sunlight. A diuretic may increase your sensitivity to the sun. Even brief sun exposure  may cause skin rash, itching, redness, or other discoloration. It may also lead to severe sunburn. To protect your skin do the following:  ¨ Avoid direct sunlight, especially between 10 a.m. and 2 p.m., whenever possible.  ¨ Apply a daily sunblock of at least SPF 15 (or higher) to any exposed skin, including your lips.  ¨ Wear protective clothing, such as a hat and sunglasses, when you are outdoors.  ¨ Avoid sunlamps and tanning booths.  ¨ Long-sleeve shirts and pants in the summer can help protect your skin.        When to seek medical advice  Call your healthcare provider right away if any of these occur:  · Have diarrhea, constipation, nausea or vomiting.  · Lose your appetite or notice a rapid or excessive weight gain.  · Feel extremely tired or weak.  · Have shortness of breath or difficulty breathing or swallowing.  · Have numbness or tingling in your hands, feet, or lips or a ringing in your ears.  · Feel lightheaded when getting up after sitting or lying down.  · Have headaches, blurred vision, or feel a sense of confusion.  · Have muscle cramps or joint pain.  · Have chest pains or changes in your heartbeat.  · Have an excessive thirst or a dry mouth.  · Notice a skin rash.  · Gain more than 2 pounds in 1 day or 4 pounds in 1 week (ask your healthcare provider for his or her specific direction).  · Have any other unusual symptoms.   Date Last Reviewed: 6/1/2016  © 2248-0883 [employer]. [address] [place] [address], [place] [address]. All rights reserved. This information is not intended as a substitute for professional medical care. Always follow your healthcare professional's instructions.

## 2019-02-12 NOTE — PROGRESS NOTES
C3 nurse attempted to contact patient. No answer. The following message was left for the patient to return the call:  Good morning,  I am a nurse calling on behalf of [facility] from the [facility].  This is a Transitional Care Call for Cecelia Currie. When you have a moment please contact us at [phone] or [phone] Monday through Friday, between the hours of 8 am to 4 pm. We look forward to speaking with you. On behalf of [facility] have a nice day.    The patient has a scheduled HOSFU appointment with Margaret Rebolledo MD on 2\19 @ 1500. Message sent to Physician staff.

## 2019-02-15 ENCOUNTER — TELEPHONE (OUTPATIENT)
Dept: INTERNAL MEDICINE | Facility: CLINIC | Age: 79
End: 2019-02-15

## 2019-02-15 NOTE — TELEPHONE ENCOUNTER
Spoke w/ Shawna but when I called she stated she found what was needed.    ----- Message from Sana Inman sent at 2/15/2019  2:17 PM CST -----  Contact: Shawna  Name of Who is Calling: Shawna    What is the request in detail: Shawna with [facility] would like a call back states she need patient diagnoses Fax [phone] Please contact to further discuss and advise      Can the clinic reply by [facility]:     What [facility] [phone]

## 2019-02-19 ENCOUNTER — OFFICE VISIT (OUTPATIENT)
Dept: INTERNAL MEDICINE | Facility: CLINIC | Age: 79
End: 2019-02-19
Attending: FAMILY MEDICINE
Payer: MEDICARE

## 2019-02-19 VITALS
DIASTOLIC BLOOD PRESSURE: 72 MMHG | SYSTOLIC BLOOD PRESSURE: 108 MMHG | HEIGHT: 60 IN | BODY MASS INDEX: 30.12 KG/M2 | WEIGHT: 153.44 LBS | HEART RATE: 60 BPM | OXYGEN SATURATION: 99 %

## 2019-02-19 DIAGNOSIS — M81.0 OSTEOPOROSIS, UNSPECIFIED OSTEOPOROSIS TYPE, UNSPECIFIED PATHOLOGICAL FRACTURE PRESENCE: ICD-10-CM

## 2019-02-19 DIAGNOSIS — E78.5 HYPERLIPIDEMIA, UNSPECIFIED HYPERLIPIDEMIA TYPE: ICD-10-CM

## 2019-02-19 DIAGNOSIS — E08.8 DIABETES MELLITUS DUE TO UNDERLYING CONDITION WITH COMPLICATION, WITHOUT LONG-TERM CURRENT USE OF INSULIN: ICD-10-CM

## 2019-02-19 DIAGNOSIS — Z90.11 HISTORY OF RIGHT MASTECTOMY: ICD-10-CM

## 2019-02-19 DIAGNOSIS — I35.0 AORTIC VALVE STENOSIS, ETIOLOGY OF CARDIAC VALVE DISEASE UNSPECIFIED: ICD-10-CM

## 2019-02-19 DIAGNOSIS — F03.90 DEMENTIA WITHOUT BEHAVIORAL DISTURBANCE, UNSPECIFIED DEMENTIA TYPE: ICD-10-CM

## 2019-02-19 DIAGNOSIS — R05.9 COUGH: ICD-10-CM

## 2019-02-19 DIAGNOSIS — I50.43 ACUTE ON CHRONIC COMBINED SYSTOLIC AND DIASTOLIC HEART FAILURE: ICD-10-CM

## 2019-02-19 DIAGNOSIS — E66.09 CLASS 1 OBESITY DUE TO EXCESS CALORIES WITH SERIOUS COMORBIDITY AND BODY MASS INDEX (BMI) OF 30.0 TO 30.9 IN ADULT: ICD-10-CM

## 2019-02-19 DIAGNOSIS — H40.9 GLAUCOMA, UNSPECIFIED GLAUCOMA TYPE, UNSPECIFIED LATERALITY: ICD-10-CM

## 2019-02-19 DIAGNOSIS — R73.03 PREDIABETES: Primary | ICD-10-CM

## 2019-02-19 PROCEDURE — 99214 PR OFFICE/OUTPT VISIT, EST, LEVL IV, 30-39 MIN: ICD-10-PCS | Mod: S$GLB,,, | Performed by: FAMILY MEDICINE

## 2019-02-19 PROCEDURE — 99499 UNLISTED E&M SERVICE: CPT | Mod: S$GLB,,, | Performed by: FAMILY MEDICINE

## 2019-02-19 PROCEDURE — 1101F PR PT FALLS ASSESS DOC 0-1 FALLS W/OUT INJ PAST YR: ICD-10-PCS | Mod: CPTII,S$GLB,, | Performed by: FAMILY MEDICINE

## 2019-02-19 PROCEDURE — 99214 OFFICE O/P EST MOD 30 MIN: CPT | Mod: S$GLB,,, | Performed by: FAMILY MEDICINE

## 2019-02-19 PROCEDURE — 99499 RISK ADDL DX/OHS AUDIT: ICD-10-PCS | Mod: S$GLB,,, | Performed by: FAMILY MEDICINE

## 2019-02-19 PROCEDURE — 99999 PR PBB SHADOW E&M-EST. PATIENT-LVL IV: ICD-10-PCS | Mod: PBBFAC,,, | Performed by: FAMILY MEDICINE

## 2019-02-19 PROCEDURE — 3074F SYST BP LT 130 MM HG: CPT | Mod: CPTII,S$GLB,, | Performed by: FAMILY MEDICINE

## 2019-02-19 PROCEDURE — 3078F DIAST BP <80 MM HG: CPT | Mod: CPTII,S$GLB,, | Performed by: FAMILY MEDICINE

## 2019-02-19 PROCEDURE — 1101F PT FALLS ASSESS-DOCD LE1/YR: CPT | Mod: CPTII,S$GLB,, | Performed by: FAMILY MEDICINE

## 2019-02-19 PROCEDURE — 3074F PR MOST RECENT SYSTOLIC BLOOD PRESSURE < 130 MM HG: ICD-10-PCS | Mod: CPTII,S$GLB,, | Performed by: FAMILY MEDICINE

## 2019-02-19 PROCEDURE — 99999 PR PBB SHADOW E&M-EST. PATIENT-LVL IV: CPT | Mod: PBBFAC,,, | Performed by: FAMILY MEDICINE

## 2019-02-19 PROCEDURE — 3078F PR MOST RECENT DIASTOLIC BLOOD PRESSURE < 80 MM HG: ICD-10-PCS | Mod: CPTII,S$GLB,, | Performed by: FAMILY MEDICINE

## 2019-02-19 RX ORDER — ALENDRONATE SODIUM 70 MG/1
70 TABLET ORAL
Qty: 12 TABLET | Refills: 3 | Status: SHIPPED | OUTPATIENT
Start: 2019-02-19 | End: 2020-01-01

## 2019-02-19 RX ORDER — CYANOCOBALAMIN 1000 UG/ML
INJECTION, SOLUTION INTRAMUSCULAR; SUBCUTANEOUS
Qty: 30 ML | Refills: 3 | Status: SHIPPED | OUTPATIENT
Start: 2019-02-19 | End: 2019-06-24 | Stop reason: SDUPTHER

## 2019-02-19 RX ORDER — ERGOCALCIFEROL 1.25 MG/1
50000 CAPSULE ORAL
Qty: 12 CAPSULE | Refills: 3 | Status: SHIPPED | OUTPATIENT
Start: 2019-02-19 | End: 2020-01-01

## 2019-02-19 RX ORDER — ENALAPRIL MALEATE 2.5 MG/1
2.5 TABLET ORAL DAILY
Qty: 90 TABLET | Refills: 3 | Status: SHIPPED | OUTPATIENT
Start: 2019-02-19 | End: 2020-01-01

## 2019-02-19 RX ORDER — ASPIRIN 81 MG/1
81 TABLET ORAL DAILY
Qty: 90 TABLET | Refills: 3 | Status: ON HOLD | OUTPATIENT
Start: 2019-02-19 | End: 2020-01-01 | Stop reason: HOSPADM

## 2019-02-19 RX ORDER — SYRINGE WITH NEEDLE, 1 ML 28GX1/2"
SYRINGE, EMPTY DISPOSABLE MISCELLANEOUS
Qty: 200 EACH | Refills: 2 | Status: ON HOLD | OUTPATIENT
Start: 2019-02-19 | End: 2020-01-01 | Stop reason: HOSPADM

## 2019-02-19 RX ORDER — DONEPEZIL HYDROCHLORIDE 5 MG/1
5 TABLET, FILM COATED ORAL DAILY
Qty: 90 TABLET | Refills: 3 | Status: SHIPPED | OUTPATIENT
Start: 2019-02-19 | End: 2020-01-01

## 2019-02-19 RX ORDER — FUROSEMIDE 20 MG/1
20 TABLET ORAL 2 TIMES DAILY
Qty: 180 TABLET | Refills: 3 | Status: SHIPPED | OUTPATIENT
Start: 2019-02-19 | End: 2020-01-01

## 2019-02-19 NOTE — PROGRESS NOTES
Subjective:      Patient ID: Cecelia Currie is a 78 y.o. female.    Chief Complaint: Hospital Follow Up    HPI   Patient here today for hospital follow up for CHF exacerbation and flu. She reports breathing is better. She is overall feeling better. She is walking more. She reports she is taking lasix once daily. She is here today with her daughter. She does have a persistent cough that has not resolved but she does feel cough during hospitalization has improved.     Breakfast  Oatmeal   Biscuits   poptarts     Lunch   Tuna  Protein/veggie/starch   Spaghetti/roll    Dinner   Tuna  Protein/veggie/starch   Spaghetti/roll      Review of Systems   Constitutional: Negative.    HENT: Negative for congestion, sinus pressure and sinus pain.    Respiratory: Positive for cough.    Cardiovascular: Negative.    Gastrointestinal: Negative for abdominal distention and abdominal pain.   Genitourinary: Negative for dysuria, flank pain and frequency.     I personally reviewed Past Medical History, Past Surgical history,  Past Social History and Family History      Objective:   /72 (BP Location: Left arm, Patient Position: Sitting)   Pulse 60   Ht 5' (1.524 m)   Wt 69.6 kg (153 lb 7 oz)   SpO2 99%   BMI 29.97 kg/m²     Physical Exam   Constitutional: She is oriented to person, place, and time. She appears well-developed and well-nourished. No distress.   HENT:   Head: Normocephalic and atraumatic.   Right Ear: Hearing, tympanic membrane, external ear and ear canal normal.   Left Ear: Hearing, tympanic membrane, external ear and ear canal normal.   Nose: Nose normal.   Mouth/Throat: Uvula is midline and oropharynx is clear and moist. No oropharyngeal exudate.   Eyes: Conjunctivae and EOM are normal. Pupils are equal, round, and reactive to light. Right eye exhibits no discharge. Left eye exhibits no discharge. No scleral icterus.   Neck: Normal range of motion. Neck supple.   Cardiovascular: Normal rate, regular rhythm,  normal heart sounds and intact distal pulses. Exam reveals no gallop.   No murmur heard.  Pulmonary/Chest: Effort normal and breath sounds normal. No respiratory distress. She has no wheezes. She has no rales. She exhibits no tenderness.   Abdominal: Soft. Bowel sounds are normal. She exhibits no distension and no mass. There is no tenderness. There is no rebound and no guarding.   Neurological: She is alert and oriented to person, place, and time.   Skin: Skin is warm and dry.   Vitals reviewed.      1. Prediabetes    2. Diabetes mellitus due to underlying condition with complication, without long-term current use of insulin     3. Class 1 obesity due to excess calories with serious comorbidity and body mass index (BMI) of 30.0 to 30.9 in adult    4. History of right mastectomy    5. Osteoporosis, unspecified osteoporosis type, unspecified pathological fracture presence    6. Glaucoma, unspecified glaucoma type, unspecified laterality    7. Aortic valve stenosis, etiology of cardiac valve disease unspecified    8. Acute on chronic combined systolic and diastolic heart failure    9. Hyperlipidemia, unspecified hyperlipidemia type    10. Dementia without behavioral disturbance, unspecified dementia type    11. Cough        1-3. Reviewed all labs checked inpatient with daughter and patient, prediabetes is new diagnosis, we will start with diabetes education/declined medication, will work on changing eating habits   4. Order placed for prosthesis   5. stable, cont fosamax, repeat dxa next year   6. Daughter will call to schedule eye appt   7. stable, cont current regimen   8. stable, cont current regimen   9. stable, cont current regimen   10. stable, cont current regimen   11. Declined change in enalapril or further work up for chronic cough   Orders Placed This Encounter   Procedures    BREAST PROSTHESIS FOR HOME USE    Ambulatory consult to Diabetic Education     Medications Ordered This Encounter   Medications  "   alendronate (FOSAMAX) 70 MG tablet     Sig: Take 1 tablet (70 mg total) by mouth every 7 days.     Dispense:  12 tablet     Refill:  3    aspirin (ECOTRIN) 81 MG EC tablet     Sig: Take 1 tablet (81 mg total) by mouth once daily.     Dispense:  90 tablet     Refill:  3    BD SAFETY-AGUS TUBERCULIN 1 mL 25 gauge x 5/8" Syrg     Sig: USE FOR B 12 INJECTIONS     Dispense:  200 each     Refill:  2    cyanocobalamin 1,000 mcg/mL injection     Sig: INJECT 1ML INTO THE MUSCLE Q 30 DAYS     Dispense:  30 mL     Refill:  3    donepezil (ARICEPT) 5 MG tablet     Sig: Take 1 tablet (5 mg total) by mouth once daily.     Dispense:  90 tablet     Refill:  3    enalapril (VASOTEC) 2.5 MG tablet     Sig: Take 1 tablet (2.5 mg total) by mouth once daily.     Dispense:  90 tablet     Refill:  3    ergocalciferol (ERGOCALCIFEROL) 50,000 unit Cap     Sig: Take 1 capsule (50,000 Units total) by mouth every 7 days.     Dispense:  12 capsule     Refill:  3    furosemide (LASIX) 20 MG tablet     Sig: Take 1 tablet (20 mg total) by mouth 2 (two) times daily.     Dispense:  180 tablet     Refill:  3     "

## 2019-02-19 NOTE — PATIENT INSTRUCTIONS
Prediabetes  You have been diagnosed with prediabetes. This means that the level of sugar (glucose) in your blood is too high. If you have prediabetes, you are at risk for developing type 2 diabetes. Type 2 diabetes is diagnosed when the level of glucose in the blood reaches a certain high level. With prediabetes, it hasnt reached this point yet, but it is higher than normal. It is vital to make lifestyle changes to lower your blood sugar, improve your health, and prevent diabetes. This sheet will tell you more.      Why worry about prediabetes?  Prediabetes is a disease where the bodys cells have trouble using glucose in the blood for energy. As a result, too much glucose stays in the blood and can affect how your heart and blood vessels work. Without changes in diet and lifestyle, the problem can get worse. Once you have type 2 diabetes, it is chronic (ongoing) and needs to be managed for the rest of your life. Diabetes can harm the body and your health by damaging organs, such as your eyes and kidneys. It makes you more likely to have heart disease. And it can damage nerves and blood vessels.  Who is a risk for prediabetes?  The exact cause of prediabetes is not clear. But certain risk factors make a person more likely to have it. These include:  · A family history of type 2 diabetes  · Being overweight  · Being over age 45  · Have hypertension or elevated cholesterol   · Having had gestational diabetes  · Not being physically active  · Being ,  American, , , , or   Diagnosing prediabetes  Prediabetes may have no symptoms or you may have some of the symptoms of diabetes. The diagnosis is made with a blood test. You may have one or more of these blood tests:   · Fasting glucose test. Blood is taken and tested after you have fasted (not eaten) for at least 8 hours. A normal test result is 99 milligrams per deciliter (mg/dL) or lower.  Prediabetes is 100 mg/dL to 125 mg/dL. Diabetes is 126 mg/dL or higher.  · Glucose tolerance test. Your blood sugar is measured before and after you drink a very sugary liquid. A normal test result is 139 milligrams per deciliter (mg/dL) or lower. Prediabetes is 140 mg/dL to 199 mg/dL. Diabetes is 200 mg/dL or higher.  · Hemoglobin A1c (HbA1c). Your HbA1c is normal if it is below 5.7%. Prediabetes is 5.7% to 6.4%. Diabetes is 6.5% or higher.   Treating prediabetes  The best way to treat prediabetes is to lose at least 5% to 7% of your current weight and be more physically active by getting at least 150 minutes a week of physical activity. When sitting for long periods of time, get up for short sessions of light activity every 30 minutes. These changes help the bodys cells use blood sugar better. Even a small amount of weight loss can help. Work with your healthcare provider to make a plan to eat well and be more active. Keep in mind that small changes can add up. Other changes in your lifestyle (or even taking certain medicines, such as metformin) may make you less likely to develop diabetes. Your healthcare provider can talk with you about these.  Follow-up  If it is untreated, prediabetes can turn into diabetes. This is a serious health condition. Take steps to stop this from happening. Follow the treatment plan you have been given. You may have your blood glucose tested again in about 12 to 18 months.  Symptoms of diabetes  Let your healthcare provider know if you have any of the following:  · Always feel very tired  · Feel very thirsty or hungry much of the time  · Have to urinate often  · Lose weight for no reason  · Feel numbness or tingling in your fingers or toes  · Have cuts or bruises that dont seem to heal  · Have blurry vision   Date Last Reviewed: 5/1/2016  © 9409-2700 [employer]. [address] [place] [address], [place] [address]. All rights reserved. This information is not intended as a  substitute for professional medical care. Always follow your healthcare professional's instructions.

## 2019-04-29 ENCOUNTER — PATIENT MESSAGE (OUTPATIENT)
Dept: INTERNAL MEDICINE | Facility: CLINIC | Age: 79
End: 2019-04-29

## 2019-04-29 DIAGNOSIS — Z90.10 STATUS POST MASTECTOMY, UNSPECIFIED LATERALITY: Primary | ICD-10-CM

## 2019-06-24 RX ORDER — CYANOCOBALAMIN 1000 UG/ML
INJECTION, SOLUTION INTRAMUSCULAR; SUBCUTANEOUS
Qty: 1 ML | Refills: 3 | Status: SHIPPED | OUTPATIENT
Start: 2019-06-24 | End: 2020-01-01 | Stop reason: SDUPTHER

## 2020-01-01 ENCOUNTER — OFFICE VISIT (OUTPATIENT)
Dept: CARDIOLOGY | Facility: CLINIC | Age: 80
DRG: 871 | End: 2020-01-01
Payer: MEDICARE

## 2020-01-01 ENCOUNTER — TELEPHONE (OUTPATIENT)
Dept: PRIMARY CARE CLINIC | Facility: CLINIC | Age: 80
End: 2020-01-01

## 2020-01-01 ENCOUNTER — LAB VISIT (OUTPATIENT)
Dept: LAB | Facility: HOSPITAL | Age: 80
End: 2020-01-01
Attending: FAMILY MEDICINE
Payer: MEDICARE

## 2020-01-01 ENCOUNTER — OFFICE VISIT (OUTPATIENT)
Dept: DERMATOLOGY | Facility: CLINIC | Age: 80
End: 2020-01-01
Payer: MEDICARE

## 2020-01-01 ENCOUNTER — PATIENT MESSAGE (OUTPATIENT)
Dept: INTERNAL MEDICINE | Facility: CLINIC | Age: 80
End: 2020-01-01

## 2020-01-01 ENCOUNTER — HOSPITAL ENCOUNTER (INPATIENT)
Facility: HOSPITAL | Age: 80
LOS: 2 days | DRG: 871 | End: 2020-09-19
Attending: EMERGENCY MEDICINE | Admitting: INTERNAL MEDICINE
Payer: MEDICARE

## 2020-01-01 ENCOUNTER — PATIENT OUTREACH (OUTPATIENT)
Dept: ADMINISTRATIVE | Facility: OTHER | Age: 80
End: 2020-01-01

## 2020-01-01 ENCOUNTER — OFFICE VISIT (OUTPATIENT)
Dept: CARDIOLOGY | Facility: CLINIC | Age: 80
End: 2020-01-01
Payer: MEDICARE

## 2020-01-01 ENCOUNTER — PATIENT MESSAGE (OUTPATIENT)
Dept: PRIMARY CARE CLINIC | Facility: CLINIC | Age: 80
End: 2020-01-01

## 2020-01-01 ENCOUNTER — OFFICE VISIT (OUTPATIENT)
Dept: PRIMARY CARE CLINIC | Facility: CLINIC | Age: 80
DRG: 871 | End: 2020-01-01
Payer: MEDICARE

## 2020-01-01 ENCOUNTER — TELEPHONE (OUTPATIENT)
Dept: CRITICAL CARE MEDICINE | Facility: HOSPITAL | Age: 80
End: 2020-01-01

## 2020-01-01 ENCOUNTER — PATIENT MESSAGE (OUTPATIENT)
Dept: ADMINISTRATIVE | Facility: HOSPITAL | Age: 80
End: 2020-01-01

## 2020-01-01 ENCOUNTER — OFFICE VISIT (OUTPATIENT)
Dept: PRIMARY CARE CLINIC | Facility: CLINIC | Age: 80
End: 2020-01-01
Payer: MEDICARE

## 2020-01-01 VITALS — HEIGHT: 60 IN | WEIGHT: 194.25 LBS | TEMPERATURE: 98 F | BODY MASS INDEX: 38.14 KG/M2 | OXYGEN SATURATION: 95 %

## 2020-01-01 VITALS
WEIGHT: 170.88 LBS | SYSTOLIC BLOOD PRESSURE: 100 MMHG | HEIGHT: 60 IN | DIASTOLIC BLOOD PRESSURE: 60 MMHG | HEART RATE: 58 BPM | BODY MASS INDEX: 33.55 KG/M2 | OXYGEN SATURATION: 98 %

## 2020-01-01 VITALS
HEART RATE: 78 BPM | DIASTOLIC BLOOD PRESSURE: 60 MMHG | SYSTOLIC BLOOD PRESSURE: 88 MMHG | HEIGHT: 60 IN | BODY MASS INDEX: 35.34 KG/M2 | HEART RATE: 87 BPM | OXYGEN SATURATION: 99 % | WEIGHT: 180 LBS | OXYGEN SATURATION: 92 %

## 2020-01-01 VITALS
OXYGEN SATURATION: 97 % | WEIGHT: 175.94 LBS | TEMPERATURE: 98 F | BODY MASS INDEX: 35.47 KG/M2 | HEIGHT: 59 IN | DIASTOLIC BLOOD PRESSURE: 84 MMHG | SYSTOLIC BLOOD PRESSURE: 136 MMHG | HEART RATE: 76 BPM

## 2020-01-01 DIAGNOSIS — Z74.09 IMPAIRED FUNCTIONAL MOBILITY, BALANCE, GAIT, AND ENDURANCE: ICD-10-CM

## 2020-01-01 DIAGNOSIS — I34.0 NON-RHEUMATIC MITRAL REGURGITATION: ICD-10-CM

## 2020-01-01 DIAGNOSIS — H40.9 GLAUCOMA, UNSPECIFIED GLAUCOMA TYPE, UNSPECIFIED LATERALITY: ICD-10-CM

## 2020-01-01 DIAGNOSIS — Z13.6 ENCOUNTER FOR LIPID SCREENING FOR CARDIOVASCULAR DISEASE: ICD-10-CM

## 2020-01-01 DIAGNOSIS — I50.23 ACUTE ON CHRONIC SYSTOLIC CONGESTIVE HEART FAILURE: ICD-10-CM

## 2020-01-01 DIAGNOSIS — R41.0 DELIRIUM: Primary | ICD-10-CM

## 2020-01-01 DIAGNOSIS — I35.0 NONRHEUMATIC AORTIC VALVE STENOSIS: ICD-10-CM

## 2020-01-01 DIAGNOSIS — R41.3 MEMORY DEFICITS: ICD-10-CM

## 2020-01-01 DIAGNOSIS — I50.43 ACUTE ON CHRONIC COMBINED SYSTOLIC AND DIASTOLIC HEART FAILURE: ICD-10-CM

## 2020-01-01 DIAGNOSIS — F03.90 DEMENTIA WITHOUT BEHAVIORAL DISTURBANCE, UNSPECIFIED DEMENTIA TYPE: ICD-10-CM

## 2020-01-01 DIAGNOSIS — M81.0 OSTEOPOROSIS, UNSPECIFIED OSTEOPOROSIS TYPE, UNSPECIFIED PATHOLOGICAL FRACTURE PRESENCE: ICD-10-CM

## 2020-01-01 DIAGNOSIS — E87.5 HYPERKALEMIA: ICD-10-CM

## 2020-01-01 DIAGNOSIS — E66.01 CLASS 2 SEVERE OBESITY DUE TO EXCESS CALORIES WITH SERIOUS COMORBIDITY AND BODY MASS INDEX (BMI) OF 35.0 TO 35.9 IN ADULT: ICD-10-CM

## 2020-01-01 DIAGNOSIS — L30.9 ECZEMA, UNSPECIFIED TYPE: ICD-10-CM

## 2020-01-01 DIAGNOSIS — I50.42 CHRONIC COMBINED SYSTOLIC AND DIASTOLIC HEART FAILURE: ICD-10-CM

## 2020-01-01 DIAGNOSIS — E53.8 VITAMIN B12 DEFICIENCY: ICD-10-CM

## 2020-01-01 DIAGNOSIS — F01.50 VASCULAR DEMENTIA WITHOUT BEHAVIORAL DISTURBANCE: ICD-10-CM

## 2020-01-01 DIAGNOSIS — H40.1192 PRIMARY OPEN-ANGLE GLAUCOMA, MODERATE STAGE, UNSPECIFIED LATERALITY: ICD-10-CM

## 2020-01-01 DIAGNOSIS — I35.0 AORTIC VALVE STENOSIS, ETIOLOGY OF CARDIAC VALVE DISEASE UNSPECIFIED: ICD-10-CM

## 2020-01-01 DIAGNOSIS — L28.0 LSC (LICHEN SIMPLEX CHRONICUS): ICD-10-CM

## 2020-01-01 DIAGNOSIS — L85.3 XEROSIS CUTIS: ICD-10-CM

## 2020-01-01 DIAGNOSIS — Z78.9 BETA-BLOCKER INTOLERANCE: ICD-10-CM

## 2020-01-01 DIAGNOSIS — L98.491 SUPERFICIAL ULCER OF SKIN: ICD-10-CM

## 2020-01-01 DIAGNOSIS — I48.91 ATRIAL FIBRILLATION, UNSPECIFIED TYPE: Primary | ICD-10-CM

## 2020-01-01 DIAGNOSIS — E78.5 HYPERLIPIDEMIA, UNSPECIFIED HYPERLIPIDEMIA TYPE: ICD-10-CM

## 2020-01-01 DIAGNOSIS — Z13.220 ENCOUNTER FOR LIPID SCREENING FOR CARDIOVASCULAR DISEASE: ICD-10-CM

## 2020-01-01 DIAGNOSIS — R53.1 WEAKNESS: ICD-10-CM

## 2020-01-01 DIAGNOSIS — I95.9 HYPOTENSIVE EPISODE: ICD-10-CM

## 2020-01-01 DIAGNOSIS — I25.118 CORONARY ARTERY DISEASE OF NATIVE ARTERY OF NATIVE HEART WITH STABLE ANGINA PECTORIS: ICD-10-CM

## 2020-01-01 DIAGNOSIS — I25.10 CORONARY ARTERY DISEASE INVOLVING NATIVE CORONARY ARTERY OF NATIVE HEART, ANGINA PRESENCE UNSPECIFIED: ICD-10-CM

## 2020-01-01 DIAGNOSIS — E55.9 VITAMIN D INSUFFICIENCY: ICD-10-CM

## 2020-01-01 DIAGNOSIS — N19 UREMIA: Primary | ICD-10-CM

## 2020-01-01 DIAGNOSIS — I48.91 ATRIAL FIBRILLATION, UNSPECIFIED TYPE: ICD-10-CM

## 2020-01-01 DIAGNOSIS — L28.0 LICHEN SIMPLEX CHRONICUS: ICD-10-CM

## 2020-01-01 DIAGNOSIS — Z01.89 ENCOUNTER FOR ROUTINE LABORATORY TESTING: ICD-10-CM

## 2020-01-01 DIAGNOSIS — E78.2 MIXED HYPERLIPIDEMIA: ICD-10-CM

## 2020-01-01 DIAGNOSIS — I48.0 PAROXYSMAL ATRIAL FIBRILLATION: ICD-10-CM

## 2020-01-01 DIAGNOSIS — Z90.11 H/O TOTAL MASTECTOMY OF RIGHT BREAST: ICD-10-CM

## 2020-01-01 DIAGNOSIS — F99 ABNORMAL MINI-MENTAL STATUS EXAM: ICD-10-CM

## 2020-01-01 DIAGNOSIS — R41.3 MEMORY LOSS: ICD-10-CM

## 2020-01-01 DIAGNOSIS — Z86.79 HISTORY OF HYPERTENSION: ICD-10-CM

## 2020-01-01 DIAGNOSIS — E53.8 VITAMIN B 12 DEFICIENCY: ICD-10-CM

## 2020-01-01 DIAGNOSIS — I27.20 PULMONARY HYPERTENSION: ICD-10-CM

## 2020-01-01 DIAGNOSIS — M81.0 AGE RELATED OSTEOPOROSIS, UNSPECIFIED PATHOLOGICAL FRACTURE PRESENCE: ICD-10-CM

## 2020-01-01 DIAGNOSIS — I50.9 CHF (CONGESTIVE HEART FAILURE): ICD-10-CM

## 2020-01-01 DIAGNOSIS — I25.10 CORONARY ARTERY DISEASE, ANGINA PRESENCE UNSPECIFIED, UNSPECIFIED VESSEL OR LESION TYPE, UNSPECIFIED WHETHER NATIVE OR TRANSPLANTED HEART: ICD-10-CM

## 2020-01-01 DIAGNOSIS — L30.4 INTERTRIGO: Primary | ICD-10-CM

## 2020-01-01 DIAGNOSIS — Z79.01 CURRENT USE OF LONG TERM ANTICOAGULATION: ICD-10-CM

## 2020-01-01 DIAGNOSIS — M81.0 AGE-RELATED OSTEOPOROSIS WITHOUT CURRENT PATHOLOGICAL FRACTURE: ICD-10-CM

## 2020-01-01 DIAGNOSIS — I48.91 ATRIAL FIBRILLATION WITH RVR: ICD-10-CM

## 2020-01-01 DIAGNOSIS — H40.1130 PRIMARY OPEN ANGLE GLAUCOMA OF BOTH EYES, UNSPECIFIED GLAUCOMA STAGE: ICD-10-CM

## 2020-01-01 DIAGNOSIS — L98.499 SKIN ULCER, UNSPECIFIED ULCER STAGE: ICD-10-CM

## 2020-01-01 DIAGNOSIS — I95.9 HYPOTENSION, UNSPECIFIED HYPOTENSION TYPE: Primary | ICD-10-CM

## 2020-01-01 DIAGNOSIS — R73.03 PREDIABETES: ICD-10-CM

## 2020-01-01 DIAGNOSIS — Z00.00 ANNUAL PHYSICAL EXAM: Primary | ICD-10-CM

## 2020-01-01 DIAGNOSIS — N17.9 AKI (ACUTE KIDNEY INJURY): ICD-10-CM

## 2020-01-01 DIAGNOSIS — I10 ESSENTIAL HYPERTENSION: ICD-10-CM

## 2020-01-01 DIAGNOSIS — L30.4 INTERTRIGO: ICD-10-CM

## 2020-01-01 LAB
25(OH)D3+25(OH)D2 SERPL-MCNC: 27 NG/ML (ref 30–96)
ALBUMIN SERPL BCP-MCNC: 2.6 G/DL (ref 3.5–5.2)
ALBUMIN SERPL BCP-MCNC: 3.4 G/DL (ref 3.5–5.2)
ALBUMIN SERPL BCP-MCNC: 4.1 G/DL (ref 3.5–5.2)
ALLENS TEST: ABNORMAL
ALP SERPL-CCNC: 131 U/L (ref 55–135)
ALP SERPL-CCNC: 64 U/L (ref 55–135)
ALP SERPL-CCNC: 83 U/L (ref 55–135)
ALT SERPL W/O P-5'-P-CCNC: 18 U/L (ref 10–44)
ALT SERPL W/O P-5'-P-CCNC: 18 U/L (ref 10–44)
ALT SERPL W/O P-5'-P-CCNC: 23 U/L (ref 10–44)
AMORPH CRY UR QL COMP ASSIST: ABNORMAL
ANION GAP SERPL CALC-SCNC: 10 MMOL/L (ref 8–16)
ANION GAP SERPL CALC-SCNC: 11 MMOL/L (ref 8–16)
ANION GAP SERPL CALC-SCNC: 12 MMOL/L (ref 8–16)
ANION GAP SERPL CALC-SCNC: 12 MMOL/L (ref 8–16)
ANION GAP SERPL CALC-SCNC: 13 MMOL/L (ref 8–16)
ANION GAP SERPL CALC-SCNC: 13 MMOL/L (ref 8–16)
ANION GAP SERPL CALC-SCNC: 14 MMOL/L (ref 8–16)
ANION GAP SERPL CALC-SCNC: 15 MMOL/L (ref 8–16)
ANION GAP SERPL CALC-SCNC: 15 MMOL/L (ref 8–16)
ANION GAP SERPL CALC-SCNC: 19 MMOL/L (ref 8–16)
ANION GAP SERPL CALC-SCNC: ABNORMAL MMOL/L (ref 8–16)
AST SERPL-CCNC: 29 U/L (ref 10–40)
AST SERPL-CCNC: 31 U/L (ref 10–40)
AST SERPL-CCNC: 47 U/L (ref 10–40)
BACTERIA #/AREA URNS AUTO: ABNORMAL /HPF
BASOPHILS # BLD AUTO: 0.02 K/UL (ref 0–0.2)
BASOPHILS # BLD AUTO: 0.04 K/UL (ref 0–0.2)
BASOPHILS # BLD AUTO: 0.05 K/UL (ref 0–0.2)
BASOPHILS # BLD AUTO: 0.06 K/UL (ref 0–0.2)
BASOPHILS NFR BLD: 0.3 % (ref 0–1.9)
BASOPHILS NFR BLD: 0.6 % (ref 0–1.9)
BASOPHILS NFR BLD: 0.7 % (ref 0–1.9)
BASOPHILS NFR BLD: 0.7 % (ref 0–1.9)
BILIRUB SERPL-MCNC: 0.5 MG/DL (ref 0.1–1)
BILIRUB SERPL-MCNC: 1.6 MG/DL (ref 0.1–1)
BILIRUB SERPL-MCNC: 1.9 MG/DL (ref 0.1–1)
BILIRUB UR QL STRIP: NEGATIVE
BNP SERPL-MCNC: 2444 PG/ML (ref 0–99)
BNP SERPL-MCNC: 2656 PG/ML (ref 0–99)
BUN SERPL-MCNC: 105 MG/DL (ref 8–23)
BUN SERPL-MCNC: 108 MG/DL (ref 8–23)
BUN SERPL-MCNC: 109 MG/DL (ref 8–23)
BUN SERPL-MCNC: 111 MG/DL (ref 8–23)
BUN SERPL-MCNC: 112 MG/DL (ref 8–23)
BUN SERPL-MCNC: 112 MG/DL (ref 8–23)
BUN SERPL-MCNC: 115 MG/DL (ref 8–23)
BUN SERPL-MCNC: 117 MG/DL (ref 8–23)
BUN SERPL-MCNC: 118 MG/DL (ref 6–30)
BUN SERPL-MCNC: 119 MG/DL (ref 8–23)
BUN SERPL-MCNC: 121 MG/DL (ref 8–23)
BUN SERPL-MCNC: 28 MG/DL (ref 8–23)
CA-I BLDV-SCNC: 1.15 MMOL/L (ref 1.06–1.42)
CALCIUM SERPL-MCNC: 10 MG/DL (ref 8.7–10.5)
CALCIUM SERPL-MCNC: 10.1 MG/DL (ref 8.7–10.5)
CALCIUM SERPL-MCNC: 12.3 MG/DL (ref 8.7–10.5)
CALCIUM SERPL-MCNC: 7.1 MG/DL (ref 8.7–10.5)
CALCIUM SERPL-MCNC: 8.1 MG/DL (ref 8.7–10.5)
CALCIUM SERPL-MCNC: 8.2 MG/DL (ref 8.7–10.5)
CALCIUM SERPL-MCNC: 8.4 MG/DL (ref 8.7–10.5)
CALCIUM SERPL-MCNC: 8.5 MG/DL (ref 8.7–10.5)
CALCIUM SERPL-MCNC: 8.5 MG/DL (ref 8.7–10.5)
CALCIUM SERPL-MCNC: 9.1 MG/DL (ref 8.7–10.5)
CALCIUM SERPL-MCNC: 9.4 MG/DL (ref 8.7–10.5)
CHLORIDE SERPL-SCNC: 105 MMOL/L (ref 95–110)
CHLORIDE SERPL-SCNC: 106 MMOL/L (ref 95–110)
CHLORIDE SERPL-SCNC: 107 MMOL/L (ref 95–110)
CHLORIDE SERPL-SCNC: 111 MMOL/L (ref 95–110)
CHLORIDE SERPL-SCNC: 112 MMOL/L (ref 95–110)
CHLORIDE SERPL-SCNC: 114 MMOL/L (ref 95–110)
CHLORIDE SERPL-SCNC: 120 MMOL/L (ref 95–110)
CHLORIDE SERPL-SCNC: 122 MMOL/L (ref 95–110)
CHLORIDE SERPL-SCNC: 124 MMOL/L (ref 95–110)
CHLORIDE UR-SCNC: 24 MMOL/L (ref 25–200)
CHOLEST SERPL-MCNC: 268 MG/DL (ref 120–199)
CHOLEST/HDLC SERPL: 5 {RATIO} (ref 2–5)
CK SERPL-CCNC: 79 U/L (ref 20–180)
CLARITY UR REFRACT.AUTO: ABNORMAL
CO2 SERPL-SCNC: 10 MMOL/L (ref 23–29)
CO2 SERPL-SCNC: 12 MMOL/L (ref 23–29)
CO2 SERPL-SCNC: 13 MMOL/L (ref 23–29)
CO2 SERPL-SCNC: 14 MMOL/L (ref 23–29)
CO2 SERPL-SCNC: 23 MMOL/L (ref 23–29)
CO2 SERPL-SCNC: 23 MMOL/L (ref 23–29)
CO2 SERPL-SCNC: 8 MMOL/L (ref 23–29)
CO2 SERPL-SCNC: <5 MMOL/L (ref 23–29)
COLOR UR AUTO: ABNORMAL
CREAT SERPL-MCNC: 1.3 MG/DL (ref 0.5–1.4)
CREAT SERPL-MCNC: 2.2 MG/DL (ref 0.5–1.4)
CREAT SERPL-MCNC: 2.5 MG/DL (ref 0.5–1.4)
CREAT SERPL-MCNC: 2.6 MG/DL (ref 0.5–1.4)
CREAT SERPL-MCNC: 2.6 MG/DL (ref 0.5–1.4)
CREAT SERPL-MCNC: 2.7 MG/DL (ref 0.5–1.4)
CREAT SERPL-MCNC: 2.8 MG/DL (ref 0.5–1.4)
CREAT SERPL-MCNC: 2.8 MG/DL (ref 0.5–1.4)
CREAT SERPL-MCNC: 2.9 MG/DL (ref 0.5–1.4)
CREAT UR-MCNC: 183 MG/DL (ref 15–325)
DELSYS: ABNORMAL
DELSYS: ABNORMAL
DIFFERENTIAL METHOD: ABNORMAL
EOSINOPHIL # BLD AUTO: 0 K/UL (ref 0–0.5)
EOSINOPHIL # BLD AUTO: 0.1 K/UL (ref 0–0.5)
EOSINOPHIL NFR BLD: 0.4 % (ref 0–8)
EOSINOPHIL NFR BLD: 0.9 % (ref 0–8)
EOSINOPHIL NFR BLD: 1.6 % (ref 0–8)
EOSINOPHIL NFR BLD: 1.8 % (ref 0–8)
ERYTHROCYTE [DISTWIDTH] IN BLOOD BY AUTOMATED COUNT: 14.1 % (ref 11.5–14.5)
ERYTHROCYTE [DISTWIDTH] IN BLOOD BY AUTOMATED COUNT: 17.4 % (ref 11.5–14.5)
ERYTHROCYTE [DISTWIDTH] IN BLOOD BY AUTOMATED COUNT: 17.7 % (ref 11.5–14.5)
ERYTHROCYTE [DISTWIDTH] IN BLOOD BY AUTOMATED COUNT: 18.6 % (ref 11.5–14.5)
ERYTHROCYTE [DISTWIDTH] IN BLOOD BY AUTOMATED COUNT: 19.3 % (ref 11.5–14.5)
EST. GFR  (AFRICAN AMERICAN): 16.7 ML/MIN/1.73 M^2
EST. GFR  (AFRICAN AMERICAN): 17.5 ML/MIN/1.73 M^2
EST. GFR  (AFRICAN AMERICAN): 17.5 ML/MIN/1.73 M^2
EST. GFR  (AFRICAN AMERICAN): 19.1 ML/MIN/1.73 M^2
EST. GFR  (AFRICAN AMERICAN): 19.1 ML/MIN/1.73 M^2
EST. GFR  (AFRICAN AMERICAN): 20 ML/MIN/1.73 M^2
EST. GFR  (AFRICAN AMERICAN): 23.4 ML/MIN/1.73 M^2
EST. GFR  (AFRICAN AMERICAN): 44.5 ML/MIN/1.73 M^2
EST. GFR  (NON AFRICAN AMERICAN): 14.5 ML/MIN/1.73 M^2
EST. GFR  (NON AFRICAN AMERICAN): 15.1 ML/MIN/1.73 M^2
EST. GFR  (NON AFRICAN AMERICAN): 15.1 ML/MIN/1.73 M^2
EST. GFR  (NON AFRICAN AMERICAN): 16.6 ML/MIN/1.73 M^2
EST. GFR  (NON AFRICAN AMERICAN): 16.6 ML/MIN/1.73 M^2
EST. GFR  (NON AFRICAN AMERICAN): 17.4 ML/MIN/1.73 M^2
EST. GFR  (NON AFRICAN AMERICAN): 20.3 ML/MIN/1.73 M^2
EST. GFR  (NON AFRICAN AMERICAN): 38.6 ML/MIN/1.73 M^2
ESTIMATED AVG GLUCOSE: 108 MG/DL (ref 68–131)
GLUCOSE SERPL-MCNC: 100 MG/DL (ref 70–110)
GLUCOSE SERPL-MCNC: 104 MG/DL (ref 70–110)
GLUCOSE SERPL-MCNC: 109 MG/DL (ref 70–110)
GLUCOSE SERPL-MCNC: 124 MG/DL (ref 70–110)
GLUCOSE SERPL-MCNC: 124 MG/DL (ref 70–110)
GLUCOSE SERPL-MCNC: 208 MG/DL (ref 70–110)
GLUCOSE SERPL-MCNC: 238 MG/DL (ref 70–110)
GLUCOSE SERPL-MCNC: 248 MG/DL (ref 70–110)
GLUCOSE SERPL-MCNC: 314 MG/DL (ref 70–110)
GLUCOSE SERPL-MCNC: 67 MG/DL (ref 70–110)
GLUCOSE SERPL-MCNC: 74 MG/DL (ref 70–110)
GLUCOSE SERPL-MCNC: 87 MG/DL (ref 70–110)
GLUCOSE UR QL STRIP: NEGATIVE
HBA1C MFR BLD HPLC: 5.4 % (ref 4–5.6)
HCO3 UR-SCNC: 10 MMOL/L (ref 24–28)
HCO3 UR-SCNC: 15.1 MMOL/L (ref 24–28)
HCO3 UR-SCNC: 8 MMOL/L (ref 24–28)
HCT VFR BLD AUTO: 43.7 % (ref 37–48.5)
HCT VFR BLD AUTO: 44.3 % (ref 37–48.5)
HCT VFR BLD AUTO: 45.8 % (ref 37–48.5)
HCT VFR BLD AUTO: 51.7 % (ref 37–48.5)
HCT VFR BLD AUTO: 61.8 % (ref 37–48.5)
HCT VFR BLD CALC: 55 %PCV (ref 36–54)
HDLC SERPL-MCNC: 54 MG/DL (ref 40–75)
HDLC SERPL: 20.1 % (ref 20–50)
HGB BLD-MCNC: 13.9 G/DL (ref 12–16)
HGB BLD-MCNC: 14.1 G/DL (ref 12–16)
HGB BLD-MCNC: 14.7 G/DL (ref 12–16)
HGB BLD-MCNC: 16.3 G/DL (ref 12–16)
HGB BLD-MCNC: 18.1 G/DL (ref 12–16)
HGB UR QL STRIP: NEGATIVE
HYALINE CASTS UR QL AUTO: 37 /LPF
IMM GRANULOCYTES # BLD AUTO: 0.02 K/UL (ref 0–0.04)
IMM GRANULOCYTES # BLD AUTO: 0.02 K/UL (ref 0–0.04)
IMM GRANULOCYTES # BLD AUTO: 0.03 K/UL (ref 0–0.04)
IMM GRANULOCYTES # BLD AUTO: 0.03 K/UL (ref 0–0.04)
IMM GRANULOCYTES NFR BLD AUTO: 0.2 % (ref 0–0.5)
IMM GRANULOCYTES NFR BLD AUTO: 0.3 % (ref 0–0.5)
IMM GRANULOCYTES NFR BLD AUTO: 0.4 % (ref 0–0.5)
IMM GRANULOCYTES NFR BLD AUTO: 0.4 % (ref 0–0.5)
INR PPP: 1.6 (ref 0.8–1.2)
KETONES UR QL STRIP: NEGATIVE
LACTATE SERPL-SCNC: 2.1 MMOL/L (ref 0.5–2.2)
LACTATE SERPL-SCNC: 2.6 MMOL/L (ref 0.5–2.2)
LACTATE SERPL-SCNC: 2.7 MMOL/L (ref 0.5–2.2)
LACTATE SERPL-SCNC: 2.8 MMOL/L (ref 0.5–2.2)
LACTATE SERPL-SCNC: 3.1 MMOL/L (ref 0.5–2.2)
LACTATE SERPL-SCNC: 7 MMOL/L (ref 0.5–2.2)
LDLC SERPL CALC-MCNC: 181.2 MG/DL (ref 63–159)
LEUKOCYTE ESTERASE UR QL STRIP: NEGATIVE
LYMPHOCYTES # BLD AUTO: 0.9 K/UL (ref 1–4.8)
LYMPHOCYTES # BLD AUTO: 1.2 K/UL (ref 1–4.8)
LYMPHOCYTES # BLD AUTO: 1.5 K/UL (ref 1–4.8)
LYMPHOCYTES # BLD AUTO: 2 K/UL (ref 1–4.8)
LYMPHOCYTES NFR BLD: 14.1 % (ref 18–48)
LYMPHOCYTES NFR BLD: 15.2 % (ref 18–48)
LYMPHOCYTES NFR BLD: 22.1 % (ref 18–48)
LYMPHOCYTES NFR BLD: 28 % (ref 18–48)
MAGNESIUM SERPL-MCNC: 2.3 MG/DL (ref 1.6–2.6)
MAGNESIUM SERPL-MCNC: 2.3 MG/DL (ref 1.6–2.6)
MAGNESIUM SERPL-MCNC: 2.7 MG/DL (ref 1.6–2.6)
MAGNESIUM SERPL-MCNC: 3 MG/DL (ref 1.6–2.6)
MAGNESIUM SERPL-MCNC: 3.2 MG/DL (ref 1.6–2.6)
MCH RBC QN AUTO: 28 PG (ref 27–31)
MCH RBC QN AUTO: 28.1 PG (ref 27–31)
MCH RBC QN AUTO: 28.2 PG (ref 27–31)
MCH RBC QN AUTO: 28.2 PG (ref 27–31)
MCH RBC QN AUTO: 29.3 PG (ref 27–31)
MCHC RBC AUTO-ENTMCNC: 29.3 G/DL (ref 32–36)
MCHC RBC AUTO-ENTMCNC: 30.3 G/DL (ref 32–36)
MCHC RBC AUTO-ENTMCNC: 31.5 G/DL (ref 32–36)
MCHC RBC AUTO-ENTMCNC: 32.3 G/DL (ref 32–36)
MCHC RBC AUTO-ENTMCNC: 33.2 G/DL (ref 32–36)
MCV RBC AUTO: 85 FL (ref 82–98)
MCV RBC AUTO: 87 FL (ref 82–98)
MCV RBC AUTO: 89 FL (ref 82–98)
MCV RBC AUTO: 96 FL (ref 82–98)
MCV RBC AUTO: 96 FL (ref 82–98)
MICROSCOPIC COMMENT: ABNORMAL
MONOCYTES # BLD AUTO: 0.3 K/UL (ref 0.3–1)
MONOCYTES # BLD AUTO: 0.7 K/UL (ref 0.3–1)
MONOCYTES # BLD AUTO: 0.7 K/UL (ref 0.3–1)
MONOCYTES # BLD AUTO: 0.9 K/UL (ref 0.3–1)
MONOCYTES NFR BLD: 10.7 % (ref 4–15)
MONOCYTES NFR BLD: 11.3 % (ref 4–15)
MONOCYTES NFR BLD: 4.3 % (ref 4–15)
MONOCYTES NFR BLD: 9.3 % (ref 4–15)
NEUTROPHILS # BLD AUTO: 4.2 K/UL (ref 1.8–7.7)
NEUTROPHILS # BLD AUTO: 4.4 K/UL (ref 1.8–7.7)
NEUTROPHILS # BLD AUTO: 5.3 K/UL (ref 1.8–7.7)
NEUTROPHILS # BLD AUTO: 5.7 K/UL (ref 1.8–7.7)
NEUTROPHILS NFR BLD: 59.9 % (ref 38–73)
NEUTROPHILS NFR BLD: 65.7 % (ref 38–73)
NEUTROPHILS NFR BLD: 71 % (ref 38–73)
NEUTROPHILS NFR BLD: 80.1 % (ref 38–73)
NITRITE UR QL STRIP: NEGATIVE
NONHDLC SERPL-MCNC: 214 MG/DL
NRBC BLD-RTO: 0 /100 WBC
NRBC BLD-RTO: 0 /100 WBC
NRBC BLD-RTO: 1 /100 WBC
NRBC BLD-RTO: 1 /100 WBC
PCO2 BLDA: 16.8 MMHG (ref 35–45)
PCO2 BLDA: 28.7 MMHG (ref 35–45)
PCO2 BLDA: 29.4 MMHG (ref 35–45)
PH SMN: 7.15 [PH] (ref 7.35–7.45)
PH SMN: 7.29 [PH] (ref 7.35–7.45)
PH SMN: 7.32 [PH] (ref 7.35–7.45)
PH UR STRIP: 5 [PH] (ref 5–8)
PHOSPHATE SERPL-MCNC: 3.2 MG/DL (ref 2.7–4.5)
PHOSPHATE SERPL-MCNC: 4.2 MG/DL (ref 2.7–4.5)
PHOSPHATE SERPL-MCNC: 4.4 MG/DL (ref 2.7–4.5)
PHOSPHATE SERPL-MCNC: 4.5 MG/DL (ref 2.7–4.5)
PLATELET # BLD AUTO: 259 K/UL (ref 150–350)
PLATELET # BLD AUTO: 264 K/UL (ref 150–350)
PLATELET # BLD AUTO: 286 K/UL (ref 150–350)
PLATELET # BLD AUTO: 288 K/UL (ref 150–350)
PLATELET # BLD AUTO: 346 K/UL (ref 150–350)
PMV BLD AUTO: 10.4 FL (ref 9.2–12.9)
PMV BLD AUTO: 10.5 FL (ref 9.2–12.9)
PMV BLD AUTO: 11.2 FL (ref 9.2–12.9)
PMV BLD AUTO: 11.4 FL (ref 9.2–12.9)
PMV BLD AUTO: 11.4 FL (ref 9.2–12.9)
PO2 BLDA: 181 MMHG (ref 80–100)
PO2 BLDA: 19 MMHG (ref 40–60)
PO2 BLDA: 31 MMHG (ref 40–60)
POC BE: -11 MMOL/L
POC BE: -19 MMOL/L
POC BE: -19 MMOL/L
POC IONIZED CALCIUM: 0.95 MMOL/L (ref 1.06–1.42)
POC SATURATED O2: 100 % (ref 95–100)
POC SATURATED O2: 26 % (ref 95–100)
POC SATURATED O2: 44 % (ref 95–100)
POC TCO2 (MEASURED): 11 MMOL/L (ref 23–29)
POC TCO2: 11 MMOL/L (ref 24–29)
POC TCO2: 16 MMOL/L (ref 24–29)
POC TCO2: 8 MMOL/L (ref 23–27)
POCT GLUCOSE: 106 MG/DL (ref 70–110)
POCT GLUCOSE: 106 MG/DL (ref 70–110)
POCT GLUCOSE: 110 MG/DL (ref 70–110)
POCT GLUCOSE: 145 MG/DL (ref 70–110)
POCT GLUCOSE: 154 MG/DL (ref 70–110)
POCT GLUCOSE: 227 MG/DL (ref 70–110)
POCT GLUCOSE: 323 MG/DL (ref 70–110)
POCT GLUCOSE: 89 MG/DL (ref 70–110)
POCT GLUCOSE: 93 MG/DL (ref 70–110)
POTASSIUM BLD-SCNC: 6.8 MMOL/L (ref 3.5–5.1)
POTASSIUM SERPL-SCNC: 4.4 MMOL/L (ref 3.5–5.1)
POTASSIUM SERPL-SCNC: 4.6 MMOL/L (ref 3.5–5.1)
POTASSIUM SERPL-SCNC: 4.6 MMOL/L (ref 3.5–5.1)
POTASSIUM SERPL-SCNC: 5.1 MMOL/L (ref 3.5–5.1)
POTASSIUM SERPL-SCNC: 5.3 MMOL/L (ref 3.5–5.1)
POTASSIUM SERPL-SCNC: 5.6 MMOL/L (ref 3.5–5.1)
POTASSIUM SERPL-SCNC: 6.2 MMOL/L (ref 3.5–5.1)
POTASSIUM SERPL-SCNC: 6.2 MMOL/L (ref 3.5–5.1)
POTASSIUM SERPL-SCNC: 6.4 MMOL/L (ref 3.5–5.1)
POTASSIUM SERPL-SCNC: 6.6 MMOL/L (ref 3.5–5.1)
POTASSIUM SERPL-SCNC: 6.7 MMOL/L (ref 3.5–5.1)
POTASSIUM UR-SCNC: 40 MMOL/L (ref 15–95)
PROT SERPL-MCNC: 6.4 G/DL (ref 6–8.4)
PROT SERPL-MCNC: 8.1 G/DL (ref 6–8.4)
PROT SERPL-MCNC: 8.9 G/DL (ref 6–8.4)
PROT UR QL STRIP: NEGATIVE
PROTHROMBIN TIME: 17.7 SEC (ref 9–12.5)
RBC # BLD AUTO: 4.75 M/UL (ref 4–5.4)
RBC # BLD AUTO: 5.04 M/UL (ref 4–5.4)
RBC # BLD AUTO: 5.23 M/UL (ref 4–5.4)
RBC # BLD AUTO: 5.79 M/UL (ref 4–5.4)
RBC # BLD AUTO: 6.41 M/UL (ref 4–5.4)
SAMPLE: ABNORMAL
SARS-COV-2 RDRP RESP QL NAA+PROBE: NEGATIVE
SITE: ABNORMAL
SODIUM BLD-SCNC: 140 MMOL/L (ref 136–145)
SODIUM SERPL-SCNC: 134 MMOL/L (ref 136–145)
SODIUM SERPL-SCNC: 137 MMOL/L (ref 136–145)
SODIUM SERPL-SCNC: 138 MMOL/L (ref 136–145)
SODIUM SERPL-SCNC: 138 MMOL/L (ref 136–145)
SODIUM SERPL-SCNC: 139 MMOL/L (ref 136–145)
SODIUM SERPL-SCNC: 139 MMOL/L (ref 136–145)
SODIUM SERPL-SCNC: 140 MMOL/L (ref 136–145)
SODIUM SERPL-SCNC: 143 MMOL/L (ref 136–145)
SODIUM UR-SCNC: <20 MMOL/L (ref 20–250)
SP GR UR STRIP: 1.01 (ref 1–1.03)
SQUAMOUS #/AREA URNS AUTO: 1 /HPF
T3 SERPL-MCNC: 73 NG/DL (ref 60–180)
T4 FREE SERPL-MCNC: 1.03 NG/DL (ref 0.71–1.51)
TRIGL SERPL-MCNC: 164 MG/DL (ref 30–150)
TROPONIN I SERPL DL<=0.01 NG/ML-MCNC: 0.03 NG/ML (ref 0–0.03)
TROPONIN I SERPL DL<=0.01 NG/ML-MCNC: 0.04 NG/ML (ref 0–0.03)
TSH SERPL DL<=0.005 MIU/L-ACNC: 1.48 UIU/ML (ref 0.4–4)
TSH SERPL DL<=0.005 MIU/L-ACNC: 2.22 UIU/ML (ref 0.4–4)
URN SPEC COLLECT METH UR: ABNORMAL
UUN UR-MCNC: 758 MG/DL (ref 140–1050)
VANCOMYCIN SERPL-MCNC: 22.2 UG/ML
VIT B12 SERPL-MCNC: 1169 PG/ML (ref 210–950)
VIT B12 SERPL-MCNC: >2000 PG/ML (ref 210–950)
WBC # BLD AUTO: 6.67 K/UL (ref 3.9–12.7)
WBC # BLD AUTO: 6.75 K/UL (ref 3.9–12.7)
WBC # BLD AUTO: 7.06 K/UL (ref 3.9–12.7)
WBC # BLD AUTO: 8.03 K/UL (ref 3.9–12.7)
WBC # BLD AUTO: 9.1 K/UL (ref 3.9–12.7)
WBC #/AREA URNS AUTO: 1 /HPF (ref 0–5)

## 2020-01-01 PROCEDURE — 20000000 HC ICU ROOM

## 2020-01-01 PROCEDURE — 25000003 PHARM REV CODE 250: Performed by: STUDENT IN AN ORGANIZED HEALTH CARE EDUCATION/TRAINING PROGRAM

## 2020-01-01 PROCEDURE — 99214 PR OFFICE/OUTPT VISIT, EST, LEVL IV, 30-39 MIN: ICD-10-PCS | Mod: 25,S$GLB,, | Performed by: INTERNAL MEDICINE

## 2020-01-01 PROCEDURE — 99291 CRITICAL CARE FIRST HOUR: CPT | Mod: GC,,, | Performed by: INTERNAL MEDICINE

## 2020-01-01 PROCEDURE — 80047 BASIC METABLC PNL IONIZED CA: CPT

## 2020-01-01 PROCEDURE — 1101F PR PT FALLS ASSESS DOC 0-1 FALLS W/OUT INJ PAST YR: ICD-10-PCS | Mod: CPTII,S$GLB,, | Performed by: INTERNAL MEDICINE

## 2020-01-01 PROCEDURE — 63600175 PHARM REV CODE 636 W HCPCS: Performed by: STUDENT IN AN ORGANIZED HEALTH CARE EDUCATION/TRAINING PROGRAM

## 2020-01-01 PROCEDURE — 99291 PR CRITICAL CARE, E/M 30-74 MINUTES: ICD-10-PCS | Mod: ,,, | Performed by: EMERGENCY MEDICINE

## 2020-01-01 PROCEDURE — 83880 ASSAY OF NATRIURETIC PEPTIDE: CPT

## 2020-01-01 PROCEDURE — 82570 ASSAY OF URINE CREATININE: CPT

## 2020-01-01 PROCEDURE — 87040 BLOOD CULTURE FOR BACTERIA: CPT

## 2020-01-01 PROCEDURE — 25000003 PHARM REV CODE 250

## 2020-01-01 PROCEDURE — 99214 OFFICE O/P EST MOD 30 MIN: CPT | Mod: 25,S$GLB,, | Performed by: INTERNAL MEDICINE

## 2020-01-01 PROCEDURE — 94761 N-INVAS EAR/PLS OXIMETRY MLT: CPT

## 2020-01-01 PROCEDURE — 84484 ASSAY OF TROPONIN QUANT: CPT

## 2020-01-01 PROCEDURE — 36556 INSERT NON-TUNNEL CV CATH: CPT | Mod: ,,, | Performed by: NURSE PRACTITIONER

## 2020-01-01 PROCEDURE — 99999 PR PBB SHADOW E&M-EST. PATIENT-LVL IV: ICD-10-PCS | Mod: PBBFAC,,, | Performed by: INTERNAL MEDICINE

## 2020-01-01 PROCEDURE — 25000003 PHARM REV CODE 250: Performed by: NURSE PRACTITIONER

## 2020-01-01 PROCEDURE — 80202 ASSAY OF VANCOMYCIN: CPT

## 2020-01-01 PROCEDURE — 99202 OFFICE O/P NEW SF 15 MIN: CPT | Mod: S$GLB,,, | Performed by: PATHOLOGY

## 2020-01-01 PROCEDURE — 1159F PR MEDICATION LIST DOCUMENTED IN MEDICAL RECORD: ICD-10-PCS | Mod: S$GLB,,, | Performed by: FAMILY MEDICINE

## 2020-01-01 PROCEDURE — S0028 INJECTION, FAMOTIDINE, 20 MG: HCPCS | Performed by: INTERNAL MEDICINE

## 2020-01-01 PROCEDURE — 93005 ELECTROCARDIOGRAM TRACING: CPT

## 2020-01-01 PROCEDURE — 93010 EKG 12-LEAD: ICD-10-PCS | Mod: S$GLB,,, | Performed by: INTERNAL MEDICINE

## 2020-01-01 PROCEDURE — 1101F PR PT FALLS ASSESS DOC 0-1 FALLS W/OUT INJ PAST YR: ICD-10-PCS | Mod: CPTII,S$GLB,, | Performed by: FAMILY MEDICINE

## 2020-01-01 PROCEDURE — 83605 ASSAY OF LACTIC ACID: CPT | Mod: 91

## 2020-01-01 PROCEDURE — 3079F PR MOST RECENT DIASTOLIC BLOOD PRESSURE 80-89 MM HG: ICD-10-PCS | Mod: CPTII,S$GLB,, | Performed by: FAMILY MEDICINE

## 2020-01-01 PROCEDURE — 1159F MED LIST DOCD IN RCRD: CPT | Mod: S$GLB,,, | Performed by: PATHOLOGY

## 2020-01-01 PROCEDURE — 80048 BASIC METABOLIC PNL TOTAL CA: CPT

## 2020-01-01 PROCEDURE — 3075F PR MOST RECENT SYSTOLIC BLOOD PRESS GE 130-139MM HG: ICD-10-PCS | Mod: CPTII,S$GLB,, | Performed by: FAMILY MEDICINE

## 2020-01-01 PROCEDURE — 92610 EVALUATE SWALLOWING FUNCTION: CPT

## 2020-01-01 PROCEDURE — 1126F PR PAIN SEVERITY QUANTIFIED, NO PAIN PRESENT: ICD-10-PCS | Mod: S$GLB,,, | Performed by: INTERNAL MEDICINE

## 2020-01-01 PROCEDURE — 99999 PR PBB SHADOW E&M-EST. PATIENT-LVL III: CPT | Mod: PBBFAC,,, | Performed by: PATHOLOGY

## 2020-01-01 PROCEDURE — 3078F DIAST BP <80 MM HG: CPT | Mod: CPTII,S$GLB,, | Performed by: INTERNAL MEDICINE

## 2020-01-01 PROCEDURE — 25000242 PHARM REV CODE 250 ALT 637 W/ HCPCS: Performed by: EMERGENCY MEDICINE

## 2020-01-01 PROCEDURE — 3079F DIAST BP 80-89 MM HG: CPT | Mod: CPTII,S$GLB,, | Performed by: FAMILY MEDICINE

## 2020-01-01 PROCEDURE — 1126F AMNT PAIN NOTED NONE PRSNT: CPT | Mod: S$GLB,,, | Performed by: INTERNAL MEDICINE

## 2020-01-01 PROCEDURE — 80061 LIPID PANEL: CPT

## 2020-01-01 PROCEDURE — 12000002 HC ACUTE/MED SURGE SEMI-PRIVATE ROOM

## 2020-01-01 PROCEDURE — 1101F PT FALLS ASSESS-DOCD LE1/YR: CPT | Mod: CPTII,S$GLB,, | Performed by: INTERNAL MEDICINE

## 2020-01-01 PROCEDURE — 84100 ASSAY OF PHOSPHORUS: CPT

## 2020-01-01 PROCEDURE — 63600175 PHARM REV CODE 636 W HCPCS: Performed by: EMERGENCY MEDICINE

## 2020-01-01 PROCEDURE — 99499 UNLISTED E&M SERVICE: CPT | Mod: S$GLB,,, | Performed by: INTERNAL MEDICINE

## 2020-01-01 PROCEDURE — 1101F PT FALLS ASSESS-DOCD LE1/YR: CPT | Mod: CPTII,S$GLB,, | Performed by: PATHOLOGY

## 2020-01-01 PROCEDURE — 36620 PR INSERT CATH,ART,PERCUT,SHORTTERM: ICD-10-PCS | Mod: 59,,, | Performed by: NURSE PRACTITIONER

## 2020-01-01 PROCEDURE — 99999 PR PBB SHADOW E&M-EST. PATIENT-LVL IV: CPT | Mod: PBBFAC,,, | Performed by: INTERNAL MEDICINE

## 2020-01-01 PROCEDURE — 3074F SYST BP LT 130 MM HG: CPT | Mod: CPTII,S$GLB,, | Performed by: FAMILY MEDICINE

## 2020-01-01 PROCEDURE — 3078F PR MOST RECENT DIASTOLIC BLOOD PRESSURE < 80 MM HG: ICD-10-PCS | Mod: CPTII,S$GLB,, | Performed by: FAMILY MEDICINE

## 2020-01-01 PROCEDURE — 82330 ASSAY OF CALCIUM: CPT

## 2020-01-01 PROCEDURE — 84133 ASSAY OF URINE POTASSIUM: CPT

## 2020-01-01 PROCEDURE — 36556 PR INSERT NON-TUNNEL CV CATH 5+ YRS OLD: ICD-10-PCS | Mod: ,,, | Performed by: NURSE PRACTITIONER

## 2020-01-01 PROCEDURE — 83605 ASSAY OF LACTIC ACID: CPT

## 2020-01-01 PROCEDURE — 82550 ASSAY OF CK (CPK): CPT

## 2020-01-01 PROCEDURE — 94640 AIRWAY INHALATION TREATMENT: CPT

## 2020-01-01 PROCEDURE — 36600 WITHDRAWAL OF ARTERIAL BLOOD: CPT

## 2020-01-01 PROCEDURE — 83735 ASSAY OF MAGNESIUM: CPT

## 2020-01-01 PROCEDURE — 82800 BLOOD PH: CPT

## 2020-01-01 PROCEDURE — 1126F PR PAIN SEVERITY QUANTIFIED, NO PAIN PRESENT: ICD-10-PCS | Mod: S$GLB,,, | Performed by: PATHOLOGY

## 2020-01-01 PROCEDURE — 99291 CRITICAL CARE FIRST HOUR: CPT | Mod: 25

## 2020-01-01 PROCEDURE — 84443 ASSAY THYROID STIM HORMONE: CPT

## 2020-01-01 PROCEDURE — 93010 EKG 12-LEAD: ICD-10-PCS | Mod: ,,, | Performed by: INTERNAL MEDICINE

## 2020-01-01 PROCEDURE — 84300 ASSAY OF URINE SODIUM: CPT

## 2020-01-01 PROCEDURE — 51702 INSERT TEMP BLADDER CATH: CPT

## 2020-01-01 PROCEDURE — 80048 BASIC METABOLIC PNL TOTAL CA: CPT | Mod: 91

## 2020-01-01 PROCEDURE — 99291 CRITICAL CARE FIRST HOUR: CPT | Mod: ,,, | Performed by: EMERGENCY MEDICINE

## 2020-01-01 PROCEDURE — 3074F SYST BP LT 130 MM HG: CPT | Mod: CPTII,S$GLB,, | Performed by: INTERNAL MEDICINE

## 2020-01-01 PROCEDURE — 3078F PR MOST RECENT DIASTOLIC BLOOD PRESSURE < 80 MM HG: ICD-10-PCS | Mod: CPTII,S$GLB,, | Performed by: INTERNAL MEDICINE

## 2020-01-01 PROCEDURE — 36415 COLL VENOUS BLD VENIPUNCTURE: CPT | Mod: PN

## 2020-01-01 PROCEDURE — 82803 BLOOD GASES ANY COMBINATION: CPT

## 2020-01-01 PROCEDURE — 36620 INSERTION CATHETER ARTERY: CPT | Mod: 59,,, | Performed by: NURSE PRACTITIONER

## 2020-01-01 PROCEDURE — 3074F PR MOST RECENT SYSTOLIC BLOOD PRESSURE < 130 MM HG: ICD-10-PCS | Mod: CPTII,S$GLB,, | Performed by: FAMILY MEDICINE

## 2020-01-01 PROCEDURE — 85027 COMPLETE CBC AUTOMATED: CPT

## 2020-01-01 PROCEDURE — 25000003 PHARM REV CODE 250: Performed by: INTERNAL MEDICINE

## 2020-01-01 PROCEDURE — 3075F SYST BP GE 130 - 139MM HG: CPT | Mod: CPTII,S$GLB,, | Performed by: FAMILY MEDICINE

## 2020-01-01 PROCEDURE — 85025 COMPLETE CBC W/AUTO DIFF WBC: CPT

## 2020-01-01 PROCEDURE — 96361 HYDRATE IV INFUSION ADD-ON: CPT

## 2020-01-01 PROCEDURE — 81001 URINALYSIS AUTO W/SCOPE: CPT

## 2020-01-01 PROCEDURE — 99291 PR CRITICAL CARE, E/M 30-74 MINUTES: ICD-10-PCS | Mod: GC,,, | Performed by: INTERNAL MEDICINE

## 2020-01-01 PROCEDURE — 93010 ELECTROCARDIOGRAM REPORT: CPT | Mod: 76,,, | Performed by: INTERNAL MEDICINE

## 2020-01-01 PROCEDURE — 93000 EKG 12-LEAD: ICD-10-PCS | Mod: S$GLB,,, | Performed by: INTERNAL MEDICINE

## 2020-01-01 PROCEDURE — 85610 PROTHROMBIN TIME: CPT

## 2020-01-01 PROCEDURE — 99999 PR PBB SHADOW E&M-EST. PATIENT-LVL IV: ICD-10-PCS | Mod: PBBFAC,,, | Performed by: FAMILY MEDICINE

## 2020-01-01 PROCEDURE — 80053 COMPREHEN METABOLIC PANEL: CPT

## 2020-01-01 PROCEDURE — 99999 PR PBB SHADOW E&M-EST. PATIENT-LVL III: ICD-10-PCS | Mod: PBBFAC,,, | Performed by: FAMILY MEDICINE

## 2020-01-01 PROCEDURE — 84480 ASSAY TRIIODOTHYRONINE (T3): CPT

## 2020-01-01 PROCEDURE — 99233 SBSQ HOSP IP/OBS HIGH 50: CPT | Mod: GC,,, | Performed by: INTERNAL MEDICINE

## 2020-01-01 PROCEDURE — 82306 VITAMIN D 25 HYDROXY: CPT

## 2020-01-01 PROCEDURE — 1101F PT FALLS ASSESS-DOCD LE1/YR: CPT | Mod: CPTII,S$GLB,, | Performed by: FAMILY MEDICINE

## 2020-01-01 PROCEDURE — 82962 GLUCOSE BLOOD TEST: CPT

## 2020-01-01 PROCEDURE — 99214 PR OFFICE/OUTPT VISIT, EST, LEVL IV, 30-39 MIN: ICD-10-PCS | Mod: S$GLB,,, | Performed by: FAMILY MEDICINE

## 2020-01-01 PROCEDURE — 84439 ASSAY OF FREE THYROXINE: CPT

## 2020-01-01 PROCEDURE — 96374 THER/PROPH/DIAG INJ IV PUSH: CPT

## 2020-01-01 PROCEDURE — 1159F MED LIST DOCD IN RCRD: CPT | Mod: S$GLB,,, | Performed by: FAMILY MEDICINE

## 2020-01-01 PROCEDURE — 99999 PR PBB SHADOW E&M-EST. PATIENT-LVL IV: CPT | Mod: PBBFAC,,, | Performed by: FAMILY MEDICINE

## 2020-01-01 PROCEDURE — 99900035 HC TECH TIME PER 15 MIN (STAT)

## 2020-01-01 PROCEDURE — 1159F MED LIST DOCD IN RCRD: CPT | Mod: S$GLB,,, | Performed by: INTERNAL MEDICINE

## 2020-01-01 PROCEDURE — 99499 RISK ADDL DX/OHS AUDIT: ICD-10-PCS | Mod: S$GLB,,, | Performed by: INTERNAL MEDICINE

## 2020-01-01 PROCEDURE — 99999 PR PBB SHADOW E&M-EST. PATIENT-LVL III: CPT | Mod: PBBFAC,,, | Performed by: FAMILY MEDICINE

## 2020-01-01 PROCEDURE — 99999 PR PBB SHADOW E&M-EST. PATIENT-LVL III: ICD-10-PCS | Mod: PBBFAC,,, | Performed by: PATHOLOGY

## 2020-01-01 PROCEDURE — 1126F AMNT PAIN NOTED NONE PRSNT: CPT | Mod: S$GLB,,, | Performed by: PATHOLOGY

## 2020-01-01 PROCEDURE — 63600175 PHARM REV CODE 636 W HCPCS: Performed by: INTERNAL MEDICINE

## 2020-01-01 PROCEDURE — 3074F PR MOST RECENT SYSTOLIC BLOOD PRESSURE < 130 MM HG: ICD-10-PCS | Mod: CPTII,S$GLB,, | Performed by: INTERNAL MEDICINE

## 2020-01-01 PROCEDURE — 3078F DIAST BP <80 MM HG: CPT | Mod: CPTII,S$GLB,, | Performed by: FAMILY MEDICINE

## 2020-01-01 PROCEDURE — 82607 VITAMIN B-12: CPT

## 2020-01-01 PROCEDURE — 93010 ELECTROCARDIOGRAM REPORT: CPT | Mod: ,,, | Performed by: INTERNAL MEDICINE

## 2020-01-01 PROCEDURE — 99202 PR OFFICE/OUTPT VISIT, NEW, LEVL II, 15-29 MIN: ICD-10-PCS | Mod: S$GLB,,, | Performed by: PATHOLOGY

## 2020-01-01 PROCEDURE — 93000 ELECTROCARDIOGRAM COMPLETE: CPT | Mod: S$GLB,,, | Performed by: INTERNAL MEDICINE

## 2020-01-01 PROCEDURE — 83036 HEMOGLOBIN GLYCOSYLATED A1C: CPT

## 2020-01-01 PROCEDURE — U0002 COVID-19 LAB TEST NON-CDC: HCPCS

## 2020-01-01 PROCEDURE — 27000221 HC OXYGEN, UP TO 24 HOURS

## 2020-01-01 PROCEDURE — 99215 PR OFFICE/OUTPT VISIT, EST, LEVL V, 40-54 MIN: ICD-10-PCS | Mod: S$GLB,,, | Performed by: FAMILY MEDICINE

## 2020-01-01 PROCEDURE — 1159F PR MEDICATION LIST DOCUMENTED IN MEDICAL RECORD: ICD-10-PCS | Mod: S$GLB,,, | Performed by: PATHOLOGY

## 2020-01-01 PROCEDURE — 82436 ASSAY OF URINE CHLORIDE: CPT

## 2020-01-01 PROCEDURE — 99215 OFFICE O/P EST HI 40 MIN: CPT | Mod: S$GLB,,, | Performed by: FAMILY MEDICINE

## 2020-01-01 PROCEDURE — 99233 PR SUBSEQUENT HOSPITAL CARE,LEVL III: ICD-10-PCS | Mod: GC,,, | Performed by: INTERNAL MEDICINE

## 2020-01-01 PROCEDURE — 99214 OFFICE O/P EST MOD 30 MIN: CPT | Mod: S$GLB,,, | Performed by: FAMILY MEDICINE

## 2020-01-01 PROCEDURE — 84540 ASSAY OF URINE/UREA-N: CPT

## 2020-01-01 PROCEDURE — 25000003 PHARM REV CODE 250: Performed by: EMERGENCY MEDICINE

## 2020-01-01 PROCEDURE — 1101F PR PT FALLS ASSESS DOC 0-1 FALLS W/OUT INJ PAST YR: ICD-10-PCS | Mod: CPTII,S$GLB,, | Performed by: PATHOLOGY

## 2020-01-01 PROCEDURE — 85025 COMPLETE CBC W/AUTO DIFF WBC: CPT | Mod: 91

## 2020-01-01 PROCEDURE — 1159F PR MEDICATION LIST DOCUMENTED IN MEDICAL RECORD: ICD-10-PCS | Mod: S$GLB,,, | Performed by: INTERNAL MEDICINE

## 2020-01-01 PROCEDURE — 96375 TX/PRO/DX INJ NEW DRUG ADDON: CPT

## 2020-01-01 PROCEDURE — 93010 ELECTROCARDIOGRAM REPORT: CPT | Mod: S$GLB,,, | Performed by: INTERNAL MEDICINE

## 2020-01-01 PROCEDURE — 83735 ASSAY OF MAGNESIUM: CPT | Mod: 91

## 2020-01-01 RX ORDER — FUROSEMIDE 20 MG/1
20 TABLET ORAL DAILY
Qty: 90 TABLET | Refills: 3
Start: 2020-01-01 | End: 2020-01-01 | Stop reason: SDUPTHER

## 2020-01-01 RX ORDER — FUROSEMIDE 10 MG/ML
120 INJECTION INTRAMUSCULAR; INTRAVENOUS ONCE
Status: COMPLETED | OUTPATIENT
Start: 2020-01-01 | End: 2020-01-01

## 2020-01-01 RX ORDER — FUROSEMIDE 20 MG/1
20 TABLET ORAL DAILY
Qty: 90 TABLET | Refills: 3 | Status: ON HOLD | OUTPATIENT
Start: 2020-01-01 | End: 2020-01-01 | Stop reason: HOSPADM

## 2020-01-01 RX ORDER — METOPROLOL TARTRATE 1 MG/ML
5 INJECTION, SOLUTION INTRAVENOUS ONCE
Status: COMPLETED | OUTPATIENT
Start: 2020-01-01 | End: 2020-01-01

## 2020-01-01 RX ORDER — ACETAMINOPHEN 325 MG/1
650 TABLET ORAL EVERY 4 HOURS PRN
Status: DISCONTINUED | OUTPATIENT
Start: 2020-01-01 | End: 2020-01-01

## 2020-01-01 RX ORDER — ASPIRIN 81 MG/1
81 TABLET ORAL DAILY
Status: DISCONTINUED | OUTPATIENT
Start: 2020-01-01 | End: 2020-01-01

## 2020-01-01 RX ORDER — FLUOCINONIDE 0.5 MG/G
CREAM TOPICAL
Qty: 60 G | Refills: 3 | Status: ON HOLD | OUTPATIENT
Start: 2020-01-01 | End: 2020-01-01 | Stop reason: HOSPADM

## 2020-01-01 RX ORDER — FAMOTIDINE 10 MG/ML
20 INJECTION INTRAVENOUS DAILY
Status: DISCONTINUED | OUTPATIENT
Start: 2020-01-01 | End: 2020-01-01

## 2020-01-01 RX ORDER — IBUPROFEN 200 MG
24 TABLET ORAL
Status: CANCELLED | OUTPATIENT
Start: 2020-01-01

## 2020-01-01 RX ORDER — MORPHINE SULFATE 2 MG/ML
2 INJECTION, SOLUTION INTRAMUSCULAR; INTRAVENOUS EVERY 4 HOURS PRN
Status: DISCONTINUED | OUTPATIENT
Start: 2020-01-01 | End: 2020-01-01 | Stop reason: HOSPADM

## 2020-01-01 RX ORDER — FUROSEMIDE 20 MG/1
TABLET ORAL
Qty: 180 TABLET | Refills: 0 | Status: SHIPPED | OUTPATIENT
Start: 2020-01-01 | End: 2020-01-01

## 2020-01-01 RX ORDER — SODIUM CHLORIDE 0.9 % (FLUSH) 0.9 %
10 SYRINGE (ML) INJECTION
Status: DISCONTINUED | OUTPATIENT
Start: 2020-01-01 | End: 2020-01-01

## 2020-01-01 RX ORDER — NOREPINEPHRINE BITARTRATE/D5W 4MG/250ML
PLASTIC BAG, INJECTION (ML) INTRAVENOUS
Status: DISPENSED
Start: 2020-01-01 | End: 2020-01-01

## 2020-01-01 RX ORDER — POTASSIUM CHLORIDE 7.45 MG/ML
60 INJECTION INTRAVENOUS
Status: DISCONTINUED | OUTPATIENT
Start: 2020-01-01 | End: 2020-01-01

## 2020-01-01 RX ORDER — ALBUTEROL SULFATE 2.5 MG/.5ML
10 SOLUTION RESPIRATORY (INHALATION)
Status: COMPLETED | OUTPATIENT
Start: 2020-01-01 | End: 2020-01-01

## 2020-01-01 RX ORDER — DOBUTAMINE HYDROCHLORIDE 200 MG/100ML
INJECTION INTRAVENOUS
Status: COMPLETED
Start: 2020-01-01 | End: 2020-01-01

## 2020-01-01 RX ORDER — CEFEPIME HYDROCHLORIDE 1 G/1
1 INJECTION, POWDER, FOR SOLUTION INTRAMUSCULAR; INTRAVENOUS
Status: DISCONTINUED | OUTPATIENT
Start: 2020-01-01 | End: 2020-01-01

## 2020-01-01 RX ORDER — ERGOCALCIFEROL 1.25 MG/1
CAPSULE ORAL
Qty: 12 CAPSULE | Refills: 0 | Status: SHIPPED | OUTPATIENT
Start: 2020-01-01 | End: 2020-01-01 | Stop reason: SDUPTHER

## 2020-01-01 RX ORDER — ENALAPRIL MALEATE 2.5 MG/1
TABLET ORAL
Qty: 30 TABLET | Refills: 0 | Status: SHIPPED | OUTPATIENT
Start: 2020-01-01 | End: 2020-01-01 | Stop reason: SDUPTHER

## 2020-01-01 RX ORDER — DONEPEZIL HYDROCHLORIDE 5 MG/1
TABLET, FILM COATED ORAL
Qty: 30 TABLET | Refills: 0 | Status: SHIPPED | OUTPATIENT
Start: 2020-01-01 | End: 2020-01-01 | Stop reason: SDUPTHER

## 2020-01-01 RX ORDER — DONEPEZIL HYDROCHLORIDE 5 MG/1
5 TABLET, FILM COATED ORAL DAILY
Status: DISCONTINUED | OUTPATIENT
Start: 2020-01-01 | End: 2020-01-01

## 2020-01-01 RX ORDER — IBUPROFEN 200 MG
16 TABLET ORAL
Status: CANCELLED | OUTPATIENT
Start: 2020-01-01

## 2020-01-01 RX ORDER — ENALAPRIL MALEATE 2.5 MG/1
2.5 TABLET ORAL DAILY
Qty: 90 TABLET | Refills: 3 | Status: ON HOLD | OUTPATIENT
Start: 2020-01-01 | End: 2020-01-01 | Stop reason: HOSPADM

## 2020-01-01 RX ORDER — CYANOCOBALAMIN 1000 UG/ML
1000 INJECTION, SOLUTION INTRAMUSCULAR; SUBCUTANEOUS
Qty: 1 ML | Refills: 11 | Status: ON HOLD | OUTPATIENT
Start: 2020-01-01 | End: 2020-01-01 | Stop reason: HOSPADM

## 2020-01-01 RX ORDER — ALENDRONATE SODIUM 70 MG/1
70 TABLET ORAL
Qty: 12 TABLET | Refills: 3 | Status: ON HOLD | OUTPATIENT
Start: 2020-01-01 | End: 2020-01-01 | Stop reason: HOSPADM

## 2020-01-01 RX ORDER — GLUCAGON 1 MG
1 KIT INJECTION
Status: CANCELLED | OUTPATIENT
Start: 2020-01-01

## 2020-01-01 RX ORDER — MAGNESIUM SULFATE HEPTAHYDRATE 40 MG/ML
2 INJECTION, SOLUTION INTRAVENOUS
Status: DISCONTINUED | OUTPATIENT
Start: 2020-01-01 | End: 2020-01-01

## 2020-01-01 RX ORDER — FAMOTIDINE 10 MG/ML
20 INJECTION INTRAVENOUS
Status: DISCONTINUED | OUTPATIENT
Start: 2020-01-01 | End: 2020-01-01

## 2020-01-01 RX ORDER — ENALAPRIL MALEATE 2.5 MG/1
TABLET ORAL
Qty: 30 TABLET | Refills: 0 | Status: SHIPPED | OUTPATIENT
Start: 2020-01-01 | End: 2020-01-01

## 2020-01-01 RX ORDER — MORPHINE SULFATE 2 MG/ML
4 INJECTION, SOLUTION INTRAMUSCULAR; INTRAVENOUS
Status: DISCONTINUED | OUTPATIENT
Start: 2020-01-01 | End: 2020-01-01 | Stop reason: HOSPADM

## 2020-01-01 RX ORDER — FLUDROCORTISONE ACETATE 0.1 MG/1
100 TABLET ORAL DAILY
Status: DISCONTINUED | OUTPATIENT
Start: 2020-01-01 | End: 2020-01-01

## 2020-01-01 RX ORDER — METOPROLOL SUCCINATE 25 MG/1
25 TABLET, EXTENDED RELEASE ORAL DAILY
Status: DISCONTINUED | OUTPATIENT
Start: 2020-01-01 | End: 2020-01-01

## 2020-01-01 RX ORDER — ONDANSETRON 2 MG/ML
4 INJECTION INTRAMUSCULAR; INTRAVENOUS EVERY 8 HOURS PRN
Status: DISCONTINUED | OUTPATIENT
Start: 2020-01-01 | End: 2020-01-01 | Stop reason: HOSPADM

## 2020-01-01 RX ORDER — ALENDRONATE SODIUM 70 MG/1
TABLET ORAL
Qty: 12 TABLET | Refills: 0 | Status: SHIPPED | OUTPATIENT
Start: 2020-01-01 | End: 2020-01-01 | Stop reason: SDUPTHER

## 2020-01-01 RX ORDER — METOPROLOL SUCCINATE 25 MG/1
25 TABLET, EXTENDED RELEASE ORAL DAILY
Qty: 30 TABLET | Refills: 11 | Status: ON HOLD | OUTPATIENT
Start: 2020-01-01 | End: 2020-01-01 | Stop reason: HOSPADM

## 2020-01-01 RX ORDER — DONEPEZIL HYDROCHLORIDE 5 MG/1
5 TABLET, FILM COATED ORAL DAILY
Status: DISCONTINUED | OUTPATIENT
Start: 2020-09-20 | End: 2020-01-01

## 2020-01-01 RX ORDER — MUPIROCIN 20 MG/G
OINTMENT TOPICAL 3 TIMES DAILY
Qty: 30 G | Refills: 1 | Status: ON HOLD | OUTPATIENT
Start: 2020-01-01 | End: 2020-01-01 | Stop reason: HOSPADM

## 2020-01-01 RX ORDER — DONEPEZIL HYDROCHLORIDE 5 MG/1
TABLET, FILM COATED ORAL
Qty: 30 TABLET | Refills: 0 | Status: SHIPPED | OUTPATIENT
Start: 2020-01-01 | End: 2020-01-01

## 2020-01-01 RX ORDER — DONEPEZIL HYDROCHLORIDE 5 MG/1
5 TABLET, FILM COATED ORAL DAILY
Qty: 90 TABLET | Refills: 3 | Status: ON HOLD | OUTPATIENT
Start: 2020-01-01 | End: 2020-01-01 | Stop reason: HOSPADM

## 2020-01-01 RX ORDER — PHENYLEPHRINE HCL IN 0.9% NACL 1 MG/10 ML
SYRINGE (ML) INTRAVENOUS
Status: COMPLETED
Start: 2020-01-01 | End: 2020-01-01

## 2020-01-01 RX ORDER — POTASSIUM CHLORIDE 7.45 MG/ML
80 INJECTION INTRAVENOUS
Status: DISCONTINUED | OUTPATIENT
Start: 2020-01-01 | End: 2020-01-01

## 2020-01-01 RX ORDER — DOBUTAMINE HYDROCHLORIDE 400 MG/100ML
2.5 INJECTION, SOLUTION INTRAVENOUS CONTINUOUS
Status: DISCONTINUED | OUTPATIENT
Start: 2020-01-01 | End: 2020-01-01

## 2020-01-01 RX ORDER — CICLOPIROX OLAMINE 7.7 MG/G
CREAM TOPICAL
Qty: 30 G | Refills: 3 | Status: ON HOLD | OUTPATIENT
Start: 2020-01-01 | End: 2020-01-01 | Stop reason: HOSPADM

## 2020-01-01 RX ORDER — ERGOCALCIFEROL 1.25 MG/1
50000 CAPSULE ORAL
Qty: 12 CAPSULE | Refills: 3 | Status: ON HOLD | OUTPATIENT
Start: 2020-01-01 | End: 2020-01-01 | Stop reason: HOSPADM

## 2020-01-01 RX ORDER — METOPROLOL TARTRATE 1 MG/ML
INJECTION, SOLUTION INTRAVENOUS
Status: DISCONTINUED
Start: 2020-01-01 | End: 2020-01-01 | Stop reason: WASHOUT

## 2020-01-01 RX ORDER — METOPROLOL TARTRATE 25 MG/1
25 TABLET, FILM COATED ORAL 2 TIMES DAILY
Status: DISCONTINUED | OUTPATIENT
Start: 2020-01-01 | End: 2020-01-01

## 2020-01-01 RX ORDER — POTASSIUM CHLORIDE 7.45 MG/ML
40 INJECTION INTRAVENOUS
Status: DISCONTINUED | OUTPATIENT
Start: 2020-01-01 | End: 2020-01-01

## 2020-01-01 RX ORDER — DEXMEDETOMIDINE HYDROCHLORIDE 4 UG/ML
0.2 INJECTION, SOLUTION INTRAVENOUS CONTINUOUS
Status: DISCONTINUED | OUTPATIENT
Start: 2020-01-01 | End: 2020-01-01

## 2020-01-01 RX ORDER — NOREPINEPHRINE BITARTRATE/D5W 4MG/250ML
0.02 PLASTIC BAG, INJECTION (ML) INTRAVENOUS CONTINUOUS
Status: DISCONTINUED | OUTPATIENT
Start: 2020-01-01 | End: 2020-01-01

## 2020-01-01 RX ORDER — MUPIROCIN 20 MG/G
OINTMENT TOPICAL 3 TIMES DAILY
Status: DISCONTINUED | OUTPATIENT
Start: 2020-01-01 | End: 2020-01-01

## 2020-01-01 RX ORDER — TRIAMCINOLONE ACETONIDE 1 MG/G
CREAM TOPICAL
Qty: 1 BOTTLE | Refills: 3 | Status: ON HOLD | OUTPATIENT
Start: 2020-01-01 | End: 2020-01-01 | Stop reason: HOSPADM

## 2020-01-01 RX ORDER — MAGNESIUM SULFATE HEPTAHYDRATE 40 MG/ML
4 INJECTION, SOLUTION INTRAVENOUS
Status: DISCONTINUED | OUTPATIENT
Start: 2020-01-01 | End: 2020-01-01

## 2020-01-01 RX ORDER — EPINEPHRINE 1 MG/ML
INJECTION, SOLUTION INTRACARDIAC; INTRAMUSCULAR; INTRAVENOUS; SUBCUTANEOUS
Status: DISCONTINUED
Start: 2020-01-01 | End: 2020-01-01 | Stop reason: HOSPADM

## 2020-01-01 RX ORDER — LORAZEPAM 2 MG/ML
1 INJECTION INTRAMUSCULAR
Status: DISCONTINUED | OUTPATIENT
Start: 2020-01-01 | End: 2020-01-01 | Stop reason: HOSPADM

## 2020-01-01 RX ORDER — LATANOPROST 50 UG/ML
SOLUTION/ DROPS OPHTHALMIC
Qty: 2.5 ML | Refills: 11 | Status: ON HOLD | OUTPATIENT
Start: 2020-01-01 | End: 2020-01-01 | Stop reason: HOSPADM

## 2020-01-01 RX ADMIN — SODIUM CHLORIDE 1000 ML: 0.9 INJECTION, SOLUTION INTRAVENOUS at 06:09

## 2020-01-01 RX ADMIN — INSULIN HUMAN 8.81 UNITS: 100 INJECTION, SOLUTION PARENTERAL at 05:09

## 2020-01-01 RX ADMIN — VASOPRESSIN 0.04 UNITS/MIN: 20 INJECTION INTRAVENOUS at 10:09

## 2020-01-01 RX ADMIN — FAMOTIDINE 20 MG: 10 INJECTION INTRAVENOUS at 09:09

## 2020-01-01 RX ADMIN — SODIUM CHLORIDE 500 ML: 0.9 INJECTION, SOLUTION INTRAVENOUS at 11:09

## 2020-01-01 RX ADMIN — Medication 0.1 MCG/KG/MIN: at 04:09

## 2020-01-01 RX ADMIN — Medication 0.02 MCG/KG/MIN: at 01:09

## 2020-01-01 RX ADMIN — MORPHINE SULFATE 4 MG: 2 INJECTION, SOLUTION INTRAMUSCULAR; INTRAVENOUS at 11:09

## 2020-01-01 RX ADMIN — AMIODARONE HYDROCHLORIDE 0.5 MG/MIN: 1.8 INJECTION, SOLUTION INTRAVENOUS at 10:09

## 2020-01-01 RX ADMIN — NOREPINEPHRINE BITARTRATE 0.34 MCG/KG/MIN: 1 INJECTION, SOLUTION, CONCENTRATE INTRAVENOUS at 11:09

## 2020-01-01 RX ADMIN — INSULIN HUMAN 10 UNITS: 100 INJECTION, SOLUTION PARENTERAL at 08:09

## 2020-01-01 RX ADMIN — VANCOMYCIN HYDROCHLORIDE 1750 MG: 10 INJECTION, POWDER, LYOPHILIZED, FOR SOLUTION INTRAVENOUS at 04:09

## 2020-01-01 RX ADMIN — METOPROLOL TARTRATE 5 MG: 5 INJECTION INTRAVENOUS at 01:09

## 2020-01-01 RX ADMIN — FAMOTIDINE 20 MG: 10 INJECTION INTRAVENOUS at 07:09

## 2020-01-01 RX ADMIN — CALCIUM GLUCONATE 1000 MG: 98 INJECTION, SOLUTION INTRAVENOUS at 07:09

## 2020-01-01 RX ADMIN — MUPIROCIN: 20 OINTMENT TOPICAL at 03:09

## 2020-01-01 RX ADMIN — SODIUM CHLORIDE 500 ML: 0.9 INJECTION, SOLUTION INTRAVENOUS at 10:09

## 2020-01-01 RX ADMIN — AMIODARONE HYDROCHLORIDE 1 MG/MIN: 1.8 INJECTION, SOLUTION INTRAVENOUS at 05:09

## 2020-01-01 RX ADMIN — VASOPRESSIN 0.04 UNITS/MIN: 20 INJECTION INTRAVENOUS at 05:09

## 2020-01-01 RX ADMIN — DEXTROSE MONOHYDRATE 25 G: 25 INJECTION, SOLUTION INTRAVENOUS at 10:09

## 2020-01-01 RX ADMIN — DEXMEDETOMIDINE HYDROCHLORIDE 0.2 MCG/KG/HR: 4 INJECTION, SOLUTION INTRAVENOUS at 08:09

## 2020-01-01 RX ADMIN — INSULIN HUMAN 8.81 UNITS: 100 INJECTION, SOLUTION PARENTERAL at 10:09

## 2020-01-01 RX ADMIN — Medication 0.1 MCG/KG/MIN: at 12:09

## 2020-01-01 RX ADMIN — DEXTROSE MONOHYDRATE 25 G: 25 INJECTION, SOLUTION INTRAVENOUS at 05:09

## 2020-01-01 RX ADMIN — CALCIUM GLUCONATE 1 G: 98 INJECTION, SOLUTION INTRAVENOUS at 08:09

## 2020-01-01 RX ADMIN — MUPIROCIN: 20 OINTMENT TOPICAL at 09:09

## 2020-01-01 RX ADMIN — CEFEPIME 1 G: 1 INJECTION, POWDER, FOR SOLUTION INTRAMUSCULAR; INTRAVENOUS at 09:09

## 2020-01-01 RX ADMIN — AMIODARONE HYDROCHLORIDE 150 MG: 1.5 INJECTION, SOLUTION INTRAVENOUS at 05:09

## 2020-01-01 RX ADMIN — CEFEPIME 1 G: 1 INJECTION, POWDER, FOR SOLUTION INTRAMUSCULAR; INTRAVENOUS at 03:09

## 2020-01-01 RX ADMIN — DEXTROSE MONOHYDRATE 25 G: 25 INJECTION, SOLUTION INTRAVENOUS at 08:09

## 2020-01-01 RX ADMIN — SODIUM BICARBONATE: 84 INJECTION, SOLUTION INTRAVENOUS at 05:09

## 2020-01-01 RX ADMIN — HYDROCORTISONE SODIUM SUCCINATE 100 MG: 100 INJECTION, POWDER, FOR SOLUTION INTRAMUSCULAR; INTRAVENOUS at 06:09

## 2020-01-01 RX ADMIN — Medication 0.24 MCG/KG/MIN: at 09:09

## 2020-01-01 RX ADMIN — SODIUM BICARBONATE: 84 INJECTION, SOLUTION INTRAVENOUS at 06:09

## 2020-01-01 RX ADMIN — ALBUTEROL SULFATE 10 MG: 2.5 SOLUTION RESPIRATORY (INHALATION) at 09:09

## 2020-01-01 RX ADMIN — METOPROLOL TARTRATE 5 MG: 5 INJECTION INTRAVENOUS at 08:09

## 2020-01-01 RX ADMIN — Medication 0.16 MCG/KG/MIN: at 08:09

## 2020-01-01 RX ADMIN — DOBUTAMINE HYDROCHLORIDE 2.5 MCG/KG/MIN: 200 INJECTION INTRAVENOUS at 11:09

## 2020-01-01 RX ADMIN — Medication 0.32 MCG/KG/MIN: at 11:09

## 2020-01-01 RX ADMIN — FUROSEMIDE 120 MG: 10 INJECTION, SOLUTION INTRAMUSCULAR; INTRAVENOUS at 06:09

## 2020-01-01 RX ADMIN — MUPIROCIN: 20 OINTMENT TOPICAL at 11:09

## 2020-01-01 RX ADMIN — AZITHROMYCIN 500 MG: 500 INJECTION, POWDER, LYOPHILIZED, FOR SOLUTION INTRAVENOUS at 11:09

## 2020-05-26 PROBLEM — R73.03 PREDIABETES: Status: ACTIVE | Noted: 2020-01-01

## 2020-05-26 PROBLEM — I50.42 CHRONIC COMBINED SYSTOLIC AND DIASTOLIC HEART FAILURE: Status: ACTIVE | Noted: 2020-01-01

## 2020-05-26 PROBLEM — Z90.11 H/O TOTAL MASTECTOMY OF RIGHT BREAST: Status: ACTIVE | Noted: 2020-01-01

## 2020-05-26 PROBLEM — F01.50 VASCULAR DEMENTIA WITHOUT BEHAVIORAL DISTURBANCE: Status: ACTIVE | Noted: 2020-01-01

## 2020-05-26 PROBLEM — I35.0 AORTIC VALVE STENOSIS: Status: ACTIVE | Noted: 2020-01-01

## 2020-05-26 PROBLEM — I25.118 CORONARY ARTERY DISEASE OF NATIVE ARTERY OF NATIVE HEART WITH STABLE ANGINA PECTORIS: Status: ACTIVE | Noted: 2020-01-01

## 2020-05-26 PROBLEM — E53.8 VITAMIN B12 DEFICIENCY: Status: ACTIVE | Noted: 2020-01-01

## 2020-05-26 PROBLEM — E78.2 MIXED HYPERLIPIDEMIA: Status: ACTIVE | Noted: 2020-01-01

## 2020-05-26 PROBLEM — Z78.9 BETA-BLOCKER INTOLERANCE: Status: ACTIVE | Noted: 2020-01-01

## 2020-05-26 NOTE — PATIENT INSTRUCTIONS
Preventing Osteoporosis: Meeting Your Calcium Needs    Your body needs calcium to build and repair bones. But it can't make calcium on its own. That's why it's important to eat calcium-rich foods. Some foods are naturally rich in calcium. Others have calcium added (fortified). It's best to get calcium from the foods you eat. But if you can't get enough, you may want to take calcium supplements. To meet your daily calcium needs, try the foods listed below.  Dairy Fish & beans Other sources      Source   Calcium (mg) per serving   Source   Calcium (mg) per serving   Source   Calcium (mg) per serving      Low-fat yogurt, plain   415 mg/8 oz.   Sardines, Atlantic, canned, with bones   351 mg/3 oz.   Oatmeal, instant, fortified   215 mg/1 cup   Nonfat milk   302 mg/1 cup   Roberta, sockeye, canned, with bones   239 mg/3 oz.   Tofu made with calcium sulfate   204 mg/3 oz.   Low-fat milk   297 mg/1 cup   Soybeans, fresh, boiled   131 mg/1/2 cup   Collards   179 mg/1/2 cup   Swiss cheese   272 mg/1 oz.   White beans, cooked   81 mg/1/2 cup   English muffin, whole wheat   175 mg/1 muffin   Cheddar cheese   205 mg/1 oz.   Navy beans, cooked   79 mg/1/2 cup   Kale   90 mg/1/2 cup   Ice cream strawberry   79 mg/1/2 cup           Orange, navel   56 mg/1 medium   Note: Calcium levels may vary depending on brand and size.  Daily calcium needs  14-18 years old: 1,300 mg  19-30 years old: 1,000 mg  31-50 years old: 1,000 mg  51-70 years old & older, women: 1,200 mg  51-70 years old, men: 1,000 mg  Pregnant or nursin-28 years old: 1,300 mg, 19-50 years old: 1,000 mg  Older than 70 (women and men): 1,200 mg   Date Last Reviewed: 10/17/2015  © 7183-9081 [employer]. [address] [place] [address], [place] [address]. All rights reserved. This information is not intended as a substitute for professional medical care. Always follow your healthcare professional's instructions.

## 2020-05-26 NOTE — PROGRESS NOTES
Subjective:       Patient ID: Cecelia Currie is a 79 y.o. female.    Chief Complaint: Annual Exam and Wound Check (under right arm)    78 y.o. female with a PMH of HTN, systolic and diastolic CHF, coronary artery disease with sinus bradycardia on Beta blocker, aortic valve stenosis, HLD , dementia, pre diabetes, obesity, history of right mastectomy, osteoporosis, glaucoma, vitamin B12 deficiency and vitamin-D insufficiency.    She is accompanied by daughter for annual physical exam.    They have no acute concerns.  They are in need of medication refills.  No adverse events with medication.    Patient has been itching right lateral chest wall and constantly reopens wounds.  No discharge.  No fever.    The following portions of the patient's history were reviewed and updated as appropriate: allergies, current medications, past family history, past medical history, past social history, past surgical history and problem list.      Review of Systems   Constitutional: Negative for activity change, appetite change, chills, diaphoresis, fatigue, fever and unexpected weight change.   HENT: Negative for congestion, dental problem, facial swelling, nosebleeds, postnasal drip, rhinorrhea, sinus pain, sore throat, tinnitus and trouble swallowing.    Eyes: Positive for visual disturbance. Negative for pain, discharge and itching.   Respiratory: Negative for cough, choking, chest tightness, shortness of breath, wheezing and stridor.    Cardiovascular: Negative for chest pain, palpitations and leg swelling.   Gastrointestinal: Negative for abdominal distention, abdominal pain, constipation, diarrhea, nausea, rectal pain and vomiting.   Endocrine: Negative for cold intolerance, heat intolerance and polyuria.   Genitourinary: Negative for difficulty urinating, dysuria, frequency, hematuria and urgency.   Musculoskeletal: Positive for arthralgias.   Skin: Positive for wound. Negative for color change and rash.   Neurological:  "Negative for dizziness, seizures, syncope, facial asymmetry, weakness and headaches.   Hematological: Negative for adenopathy. Does not bruise/bleed easily.   Psychiatric/Behavioral: Positive for confusion and decreased concentration. Negative for agitation, hallucinations, self-injury and suicidal ideas. The patient is not hyperactive.        Objective:       Vitals:    05/26/20 1359   BP: 136/84   BP Location: Left arm   Patient Position: Sitting   BP Method: Large (Manual)   Pulse: 76   Temp: 98.2 °F (36.8 °C)   TempSrc: Oral   SpO2: 97%   Weight: 79.8 kg (175 lb 14.8 oz)   Height: 4' 11" (1.499 m)     Physical Exam   Constitutional: She appears well-developed and well-nourished. No distress.   HENT:   Head: Atraumatic.   Eyes: Conjunctivae are normal.   Neck: Neck supple. No thyromegaly present.   Cardiovascular: Normal rate, regular rhythm and intact distal pulses.   Murmur heard.  Pulmonary/Chest: Effort normal and breath sounds normal.   Abdominal: Soft. Bowel sounds are normal. There is no rebound.   Musculoskeletal: She exhibits no edema.   Neurological: She is alert. She displays normal reflexes. She exhibits normal muscle tone.   Skin: Skin is warm.   Superficial ulceration at right lateral chest wall, no signs of purulence   Psychiatric: She has a normal mood and affect.   Lacks insight       Assessment:       1. Annual physical exam    2. Encounter for routine laboratory testing    3. Vitamin D insufficiency    4. Vitamin B12 deficiency    5. Encounter for lipid screening for cardiovascular disease    6. Vascular dementia without behavioral disturbance    7. Memory deficits    8. Abnormal mini-mental status exam    9. Essential hypertension    10. Beta-blocker intolerance    11. Chronic combined systolic and diastolic heart failure    12. Coronary artery disease of native artery of native heart with stable angina pectoris    13. Aortic valve stenosis, etiology of cardiac valve disease unspecified    14. " Prediabetes    15. Mixed hyperlipidemia    16. Class 2 severe obesity due to excess calories with serious comorbidity and body mass index (BMI) of 35.0 to 35.9 in adult    17. Age-related osteoporosis without current pathological fracture    18. Primary open angle glaucoma of both eyes, unspecified glaucoma stage    19. H/O total mastectomy of right breast    20. Superficial ulcer of skin        Plan:       1. Annual physical exam  Age appropriate prevention guidelines implemented, unless patient refused  Encourage Advanced directives  Labs ordered   Immunizations reviewed. Encourage yearly influenza vaccine.  Patient Counseling:  --Nutrition: Encourage healthy food choices, adequate water intake, moderate sodium/caffeine intake, diet low in saturated fat and cholesterol. Importance of caloric balance and sufficient intake of fresh fruits, vegetables, fiber, calcium, iron, and 1 mg of folate supplement per day (for females capable of pregnancy).  --Exercise: Stressed the importance of regular exercise.   --Substance Abuse: Abstinence from tobacco products. No more than 2 alcoholic drinks per day in men or 1 alcoholic drink per day in women. Avoid illicit drugs, driving or other dangerous activities under the influence. In the event of abuse, treatment offered.   --Sexuality: Safe sex practices to avoid sexually transmitted diseases; careful partner selection, use of condoms and contraceptive alternatives, avoidance of unintended pregnancy in fertile women of child-bearing age  --Injury prevention: encourage safety belts, safety helmets, smoke detector.  --Dental health: Discussed importance of regular tooth brushing X2 daily, flossing X1 daily, and routine dental visits.  --Encourage use of sun screen, wear sun protective clothing  --Encourage routine eye exams    2. Encounter for routine laboratory testing  -     CBC Without Differential; Future; Expected date: 05/26/2020  -     Comprehensive metabolic panel;  Future; Expected date: 05/26/2020    3. Vitamin D insufficiency  -     Vitamin D; Future; Expected date: 05/26/2020    4. Vitamin B12 deficiency  -     Vitamin B12; Future; Expected date: 05/26/2020    5. Encounter for lipid screening for cardiovascular disease  -     Lipid Panel; Future; Expected date: 05/26/2020    6. Vascular dementia without behavioral disturbance  -     Discontinue: furosemide (LASIX) 20 MG tablet; Take 1 tablet (20 mg total) by mouth once daily. Check daily weights. May administer 2nd dose if increased weight or swelling prn  Dispense: 90 tablet; Refill: 3  -     donepeziL (ARICEPT) 5 MG tablet; Take 1 tablet (5 mg total) by mouth once daily.  Dispense: 90 tablet; Refill: 3    7. Memory deficits  -     CBC Without Differential; Future; Expected date: 05/26/2020  -     TSH; Future; Expected date: 05/26/2020  -     T4, free; Future; Expected date: 05/26/2020  -     T3; Future; Expected date: 05/26/2020    8. Abnormal mini-mental status exam  Mini-mental score of 18, moderate cognitive impairment        9. Essential hypertension  Blood pressure on target.  On enalapril    10. Beta-blocker intolerance  Avoid beta-blocker.    11. Chronic combined systolic and diastolic heart failure  -     furosemide (LASIX) 20 MG tablet; Take 1 tablet (20 mg total) by mouth once daily. Check daily weights. May administer 2nd dose if increased weight or swelling prn  Dispense: 90 tablet; Refill: 3  -     enalapril (VASOTEC) 2.5 MG tablet; Take 1 tablet (2.5 mg total) by mouth once daily.  Dispense: 90 tablet; Refill: 3  On aspirin    12. Coronary artery disease of native artery of native heart with stable angina pectoris  Not on statin.    13. Aortic valve stenosis, etiology of cardiac valve disease unspecified  On antihypertensive regimen and Asprin.    14. Prediabetes  A1c.  Encourage healthy lifestyle changes.    15. Mixed hyperlipidemia  Encourage healthy lifestyle changes through diet and exercise    16. Class  2 severe obesity due to excess calories with serious comorbidity and body mass index (BMI) of 35.0 to 35.9 in adult  Physical activity as tolerated.  Healthy food choices.    17. Age-related osteoporosis without current pathological fracture  -     DXA Bone Density Spine And Hip; Future; Expected date: 05/26/2021  -     alendronate (FOSAMAX) 70 MG tablet; Take 1 tablet (70 mg total) by mouth every 7 days.  Dispense: 12 tablet; Refill: 3    18. Primary open angle glaucoma of both eyes, unspecified glaucoma stage  Follow with ophthalmology    19. H/O total mastectomy of right breast  -     POST-MASTECTOMY BRA FOR HOME USE  -     BREAST PROSTHESIS FOR HOME USE    20. Superficial skin ulcer  Daughter reports that patient has been seen by dermatology.  Declined referral to dermatology; discussed that wounds that have a delayed healing require evaluation to ensure no malignancy.  Daughter said that the wound heals but the patient scratches it back open again.  Advised to apply honey to assist with wound healing.  Once wound completely heals over then apply aloe vera and/ or Eucerin to prevent dryness and itching.     Disclaimer: This note has been generated using voice-recognition software. There may be typographical errors that have been missed during proof-reading

## 2020-05-28 PROBLEM — R73.03 PREDIABETES: Status: RESOLVED | Noted: 2020-01-01 | Resolved: 2020-01-01

## 2020-05-29 NOTE — TELEPHONE ENCOUNTER
Spoke with patient daughter, informed of lab results per provider message and recommendations.  She will start health diet regimen instead of cholesterol medication first.    She is also aware that information is available through portal and copies mailed to home. Daughter verbalized understanding.

## 2020-05-29 NOTE — TELEPHONE ENCOUNTER
----- Message from Tosin Wharton MD sent at 5/28/2020  7:17 PM CDT -----  Patient has dementia. Update daughter.    Please discussed with patient & relative if she is missing any doses of vitamin-D weekly:  If so she will need to take vitamin D twice a week and I will send a prescription to pharmacy.    Please let patient & relative know that I sent Rx for Vitamin B to pharmacy.    Starting a cholesterol pill is optional as long as there was no history of brain bleed or trouble taking a cholesterol pill. She has increased age and many medical problems. Let me know if patient/relative wants to start an cholesterol pill.    Avoid NSAIDs and ensure adequate hydration for good kidney function.      Message sent to portal:-    Hello,    No diabetes.  Encourage physical activity.  Encourage healthy eating.    Vitamin B12 is elevated.  Vitamin B12 is water soluble/dissolved in water.  Therefore at this level there is no concern for toxicity/excess vitamin B12 causing problems.  Prescription sent to pharmacy.    Vitamin-D is a little low.  Make sure to take 89651 units weekly.  If she is taking 65310 units weekly without missing doses then take 93255 units twice weekly.      Thyroid function normal    No anemia.    She was a little dehydrated as evidenced by blood work.  Make sure that she drinks small sips of water throughout the day.  Staying hydrated would be good to maintain her kidney function.    Kidneys functions are impaired but stable.  Avoid nonsteroidal anti-inflammatory since this worsens kidney function.    Cholesterol is elevated.  May start cholesterol pill as along she has no history of a hemorrhagic stroke (brain bleed with stroke) or trouble with taking cholesterol pills. Taking a cholesterol pill is optional given her age and medical problems.    No anemia.      Letter sent

## 2020-06-04 NOTE — TELEPHONE ENCOUNTER
Advised that [facility] sent an approval notice this morning. [facility] will contact [facility] directly.

## 2020-06-04 NOTE — TELEPHONE ENCOUNTER
"----- Message from Hans Reeves sent at 6/4/2020 10:46 AM CDT -----  Contact: [facility] [phone]   Patient would like to get medical advice.      Comments: Calling to check the status if the office has requested PA for the patient "Bra and Prosthesis" call to update. [facility] [phone]     Please call an advise  Thank you    "

## 2020-07-15 NOTE — PROGRESS NOTES
Requested updates within Care Everywhere.  Patient's chart was reviewed for overdue CLARISSA topics.  Immunizations reconciled.    Orders placed:n/a  Tasked appts:n/a  Labs Linked:n/a

## 2020-07-16 NOTE — PROGRESS NOTES
Subjective:       Patient ID:  Cecelia Currie is a 80 y.o. female who presents for   Chief Complaint   Patient presents with    Rash      L breast      HPI Pt presents today for rash L breast x 2 weeks , Tender . Does have drainage clear like fluid Daughter is treating with Neosporin. Rash on her thighs and legs , treating with Neosporin.     Accompanied by daughter.  Pt s/p mastectomy on right with significant redundant skin and soft tissue.  Undersurface of redundant tissue on right and inframammary area of left breast with frequent redness, moisture, odor, focal hyperpigmentation and scale.  Has additional hyperpigmented, pruritic scaly patches to thighs.  Daughter notes frequent picking, scratching, rubbing.    Review of Systems   Constitutional: Negative for fever and chills.   Skin: Positive for itching, rash and dry skin. Negative for sensitivity to antibiotic ointment, sensitivity to bandage adhesive and tendency to form keloidal scars.        Objective:    Physical Exam   Constitutional: She appears well-developed and well-nourished.   Neurological: She is alert.   Skin:   Areas Examined (abnormalities noted in diagram):   Head / Face Inspection Performed  Neck Inspection Performed  Chest / Axilla Inspection Performed  Abdomen Inspection Performed  Back Inspection Performed  RUE Inspected  LUE Inspection Performed  RLE Inspected  LLE Inspection Performed              Diagram Legend     Erythematous scaling macule/papule c/w actinic keratosis       Vascular papule c/w angioma      Pigmented verrucoid papule/plaque c/w seborrheic keratosis      Yellow umbilicated papule c/w sebaceous hyperplasia      Irregularly shaped tan macule c/w lentigo     1-2 mm smooth white papules consistent with Milia      Movable subcutaneous cyst with punctum c/w epidermal inclusion cyst      Subcutaneous movable cyst c/w pilar cyst      Firm pink to brown papule c/w dermatofibroma      Pedunculated fleshy papule(s) c/w  skin tag(s)      Evenly pigmented macule c/w junctional nevus     Mildly variegated pigmented, slightly irregular-bordered macule c/w mildly atypical nevus      Flesh colored to evenly pigmented papule c/w intradermal nevus       Pink pearly papule/plaque c/w basal cell carcinoma      Erythematous hyperkeratotic cursted plaque c/w SCC      Surgical scar with no sign of skin cancer recurrence      Open and closed comedones      Inflammatory papules and pustules      Verrucoid papule consistent consistent with wart     Erythematous eczematous patches and plaques     Dystrophic onycholytic nail with subungual debris c/w onychomycosis     Umbilicated papule    Erythematous-base heme-crusted tan verrucoid plaque consistent with inflamed seborrheic keratosis     Erythematous Silvery Scaling Plaque c/w Psoriasis     See annotation      Assessment / Plan:        Intertrigo  -     triamcinolone acetonide 0.1% (KENALOG) 0.1 % cream; AAA bid after cool blow dry  Dispense: 1 Bottle; Refill: 3  -     ciclopirox (LOPROX) 0.77 % Crea; AAA bid after cool blow dry  Dispense: 30 g; Refill: 3    Recommend white vinegar: water 1:1 compresses 2x/day followed by cool blow dry and then application of prescription medication.     Cool blow dry after showering. Once clear, use Zeasorb AF powder for maintenance.    Eczema, unspecified type  -     fluocinonide 0.05% (LIDEX) 0.05 % cream; AAA bid to affected intact skin of right breast and remainder of body as needed; do not use to face or groin  Dispense: 60 g; Refill: 3    LSC (lichen simplex chronicus)  -     fluocinonide 0.05% (LIDEX) 0.05 % cream; AAA bid to affected intact skin of right breast and remainder of body as needed; do not use to face or groin  Dispense: 60 g; Refill: 3    Xerosis cutis - Good skin care regimen discussed including limiting to one bath or shower/day, using lukewarm water with mild soap and moisturizing cream to skin 1 - 2x/day. Brochure was provided and reviewed  with patient.      Skin ulcer, unspecified ulcer stage  -     mupirocin (BACTROBAN) 2 % ointment; Apply topically 3 (three) times daily. To ulcerated area of right breast  Dispense: 30 g; Refill: 1             Follow up in about 3 months (around 10/16/2020).

## 2020-07-16 NOTE — PATIENT INSTRUCTIONS
Recommend white vinegar: water 1:1 compresses 2x/day followed by cool blow dry and then application of prescription medication.     Cool blow dry after showering. Once clear, use Zeasorb AF powder for maintenance.

## 2020-07-23 NOTE — PROGRESS NOTES
Subjective:    Patient ID:  Cecelia Currie is a 80 y.o. female who presents for follow-up of shortness of breath    HPI     Ms. Currie is a 80 year old femalelast seen by Cardiology 7/17/18 with a past medical history of severe MR, PHTN, HTN, breast cancer 1985 s/p chemo, XRT and mastectomy, MADYSON . She received most of her care from [facility] where she was noted to have severe MR. From the records, it appears that she was evaluated for valve replacement as far back as 2014. She had borderline EF of 40-45%, and received a SANDI with LHC/RHC. I was not able to find the results of the SANDI, but it stated that she had severe MR and moderate-severe TR. Her LHC/RHC  found a subtotal LAD and moderate leasion in her RCA with adequate targets for bypass. RHC numbers could not be found. She was eventually sent for surgical referral, and the notes state that she was denied due to debility and the pulmonary hypertension. She was then referred to interventional cardiology to receive PCI to LAD lesion, however, she did not receive this stent as she did not understand the procedure. She never followed up afterwards.She was brought today by her daughter to evaluate SOB. The patient's daugther reports increase in MAIK, only with bathing and no SOB at rest or orthopnea .She has had no edema for over a year. EKG today reveals atrial fibrillation with rapid AV response        CONCLUSIONS     1 - Eccentric hypertrophy.     2 - Severely depressed left ventricular systolic function (EF 25-30%).     3 - No wall motion abnormalities.     4 - Right ventricular enlargement with moderately depressed systolic function.     5 - Pulmonary hypertension. The estimated PA systolic pressure is 49 mmHg.     6 - Severe mitral regurgitation.     7 - Severe tricuspid regurgitation.     8 - Biatrial enlargement.             This document has been electronically    SIGNED BY: May Gonsales MD On: 06/18/2018 16  Lab Results   Component Value  "Date     05/26/2020    K 5.1 05/26/2020     05/26/2020    CO2 23 05/26/2020    BUN 28 (H) 05/26/2020    CREATININE 1.3 05/26/2020     05/26/2020    HGBA1C 5.4 05/26/2020    MG 2.1 02/09/2019    AST 29 05/26/2020    ALT 18 05/26/2020    ALBUMIN 4.1 05/26/2020    PROT 8.9 (H) 05/26/2020    BILITOT 0.5 05/26/2020    WBC 9.10 05/26/2020    HGB 13.9 05/26/2020    HCT 45.8 05/26/2020    MCV 96 05/26/2020     05/26/2020    TSH 1.483 05/26/2020         Lab Results   Component Value Date    CHOL 268 (H) 05/26/2020    HDL 54 05/26/2020    TRIG 164 (H) 05/26/2020       Lab Results   Component Value Date    LDLCALC 181.2 (H) 05/26/2020       Past Medical History:   Diagnosis Date    Aortic stenosis     Breast cancer 1987    Glaucoma     Glaucoma     Heart disease     Heart failure     HTN (hypertension)     Morbid obesity     MADYSON (obstructive sleep apnea)     Osteoporosis     Tricuspid regurgitation     Vitamin D deficiency     Xerosis of skin        Current Outpatient Medications:     alendronate (FOSAMAX) 70 MG tablet, Take 1 tablet (70 mg total) by mouth every 7 days., Disp: 12 tablet, Rfl: 3    BD SAFETY-AGUS TUBERCULIN 1 mL 25 gauge x 5/8" Syrg, USE FOR B 12 INJECTIONS, Disp: 200 each, Rfl: 2    ciclopirox (LOPROX) 0.77 % Crea, AAA bid after cool blow dry, Disp: 30 g, Rfl: 3    cyanocobalamin 1,000 mcg/mL injection, Inject 1 mL (1,000 mcg total) into the skin every 30 days. INJECT 1 VIAL IM EVERY MONTH, Disp: 1 mL, Rfl: 11    donepeziL (ARICEPT) 5 MG tablet, Take 1 tablet (5 mg total) by mouth once daily., Disp: 90 tablet, Rfl: 3    emollient combination no.40 Lotn, apply to her skin bid, Disp: , Rfl:     enalapril (VASOTEC) 2.5 MG tablet, Take 1 tablet (2.5 mg total) by mouth once daily., Disp: 90 tablet, Rfl: 3    ergocalciferol (VITAMIN D2) 50,000 unit Cap, Take 1 capsule (50,000 Units total) by mouth every 7 days., Disp: 12 capsule, Rfl: 3    fluocinonide 0.05% (LIDEX) " "0.05 % cream, AAA bid to affected intact skin of right breast and remainder of body as needed; do not use to face or groin, Disp: 60 g, Rfl: 3    furosemide (LASIX) 20 MG tablet, Take 1 tablet (20 mg total) by mouth once daily. Check daily weights. May administer 2nd dose if increased weight or swelling prn, Disp: 90 tablet, Rfl: 3    latanoprost 0.005 % ophthalmic solution, 1 DROP IN EACH EYE EVERY DAY AS DIRECTED, Disp: 2.5 mL, Rfl: 11    mupirocin (BACTROBAN) 2 % ointment, Apply topically 3 (three) times daily. To ulcerated area of right breast, Disp: 30 g, Rfl: 1    needle, disp, 25 gauge 25 gauge x 1" Ndle, For use with B12 injections., Disp: 100 each, Rfl: 2    triamcinolone acetonide 0.1% (KENALOG) 0.1 % cream, AAA bid after cool blow dry, Disp: 1 Bottle, Rfl: 3    apixaban (ELIQUIS) 2.5 mg Tab, Take 1 tablet (2.5 mg total) by mouth 2 (two) times daily., Disp: 60 tablet, Rfl: 11    aspirin (ECOTRIN) 81 MG EC tablet, Take 1 tablet (81 mg total) by mouth once daily., Disp: 90 tablet, Rfl: 3    metoprolol succinate (TOPROL-XL) 25 MG 24 hr tablet, Take 1 tablet (25 mg total) by mouth once daily., Disp: 30 tablet, Rfl: 11          Review of Systems   Constitution: Negative for decreased appetite, diaphoresis, fever, malaise/fatigue, weight gain and weight loss.   HENT: Negative for congestion, ear discharge, ear pain and nosebleeds.    Eyes: Negative for blurred vision, double vision and visual disturbance.   Cardiovascular: Positive for dyspnea on exertion. Negative for chest pain, claudication, cyanosis, irregular heartbeat, leg swelling, near-syncope, orthopnea, palpitations, paroxysmal nocturnal dyspnea and syncope.   Respiratory: Negative for cough, hemoptysis, shortness of breath, sleep disturbances due to breathing, snoring, sputum production and wheezing.    Endocrine: Negative for polydipsia, polyphagia and polyuria.   Hematologic/Lymphatic: Negative for adenopathy and bleeding problem. Does not " bruise/bleed easily.   Skin: Negative for color change, nail changes, poor wound healing and rash.   Musculoskeletal: Negative for muscle cramps and muscle weakness.   Gastrointestinal: Negative for abdominal pain, anorexia, change in bowel habit, hematochezia, nausea and vomiting.   Genitourinary: Negative for dysuria, frequency and hematuria.   Neurological: Negative for brief paralysis, difficulty with concentration, excessive daytime sleepiness, dizziness, focal weakness, headaches, light-headedness, seizures, vertigo and weakness.   Psychiatric/Behavioral: Positive for memory loss. Negative for altered mental status and depression.   Allergic/Immunologic: Negative for persistent infections.        Objective:/60   Pulse (!) 58   Ht 5' (1.524 m)   Wt 77.5 kg (170 lb 13.7 oz)   SpO2 98%   BMI 33.37 kg/m²             Physical Exam   Constitutional: She is oriented to person, place, and time. She appears well-developed and well-nourished.   HENT:   Head: Normocephalic.   Right Ear: External ear normal.   Left Ear: External ear normal.   Nose: Nose normal.   Inspection of lips, teeth and gums normal   Eyes: Pupils are equal, round, and reactive to light. Conjunctivae and EOM are normal. No scleral icterus.   Neck: Normal range of motion. No JVD present. No tracheal deviation present. No thyromegaly present.   Cardiovascular: Normal heart sounds and intact distal pulses. An irregularly irregular rhythm present. Tachycardia present. Exam reveals no gallop and no friction rub.   No murmur heard.  No edema   Pulmonary/Chest: Effort normal and breath sounds normal. No respiratory distress. She has no wheezes. She has no rales. She exhibits no tenderness.   Abdominal: Soft. Bowel sounds are normal. She exhibits no distension. There is no hepatosplenomegaly. There is no abdominal tenderness. There is no guarding.   Musculoskeletal: Normal range of motion.         General: No tenderness or edema.    Lymphadenopathy:   Palpation of lymph nodes of neck and groin normal   Neurological: She is oriented to person, place, and time. No cranial nerve deficit. She exhibits normal muscle tone. Coordination normal.   Skin: Skin is warm and dry. No rash noted. No erythema. No pallor.   Palpation of skin normal   Psychiatric: She has a normal mood and affect. Her behavior is normal. Judgment and thought content normal.         Assessment:       1. Atrial fibrillation, unspecified type    2. Non-rheumatic mitral regurgitation    3. Coronary artery disease, angina presence unspecified, unspecified vessel or lesion type, unspecified whether native or transplanted heart    4. Chronic combined systolic and diastolic heart failure    5. Coronary artery disease of native artery of native heart with stable angina pectoris    6. Nonrheumatic aortic valve stenosis         Plan:       Cecelia was seen today for shortness of breath.    Diagnoses and all orders for this visit:    Atrial fibrillation, unspecified type  -     apixaban (ELIQUIS) 2.5 mg Tab; Take 1 tablet (2.5 mg total) by mouth 2 (two) times daily.  -     IN OFFICE EKG 12-LEAD (to Muse)  -     metoprolol succinate (TOPROL-XL) 25 MG 24 hr tablet; Take 1 tablet (25 mg total) by mouth once daily.    Non-rheumatic mitral regurgitation    Coronary artery disease, angina presence unspecified, unspecified vessel or lesion type, unspecified whether native or transplanted heart    Chronic combined systolic and diastolic heart failure    Coronary artery disease of native artery of native heart with stable angina pectoris    Nonrheumatic aortic valve stenosis

## 2020-09-17 PROBLEM — I48.91 ATRIAL FIBRILLATION: Status: ACTIVE | Noted: 2020-01-01

## 2020-09-17 PROBLEM — I27.20 PULMONARY HYPERTENSION: Status: ACTIVE | Noted: 2020-01-01

## 2020-09-17 PROBLEM — I21.4 NSTEMI (NON-ST ELEVATED MYOCARDIAL INFARCTION): Status: ACTIVE | Noted: 2020-01-01

## 2020-09-17 PROBLEM — Z74.09 IMPAIRED FUNCTIONAL MOBILITY, BALANCE, GAIT, AND ENDURANCE: Status: ACTIVE | Noted: 2020-01-01

## 2020-09-17 PROBLEM — Z79.01 CURRENT USE OF LONG TERM ANTICOAGULATION: Status: ACTIVE | Noted: 2020-01-01

## 2020-09-17 PROBLEM — N19 UREMIA: Status: ACTIVE | Noted: 2020-01-01

## 2020-09-17 PROBLEM — L28.0 LICHEN SIMPLEX CHRONICUS: Status: ACTIVE | Noted: 2020-01-01

## 2020-09-17 PROBLEM — L30.4 INTERTRIGO: Status: ACTIVE | Noted: 2020-01-01

## 2020-09-17 PROBLEM — E87.5 HYPERKALEMIA: Status: ACTIVE | Noted: 2020-01-01

## 2020-09-17 NOTE — ED PROVIDER NOTES
"Source of History:  Daughter    Chief complaint:  Altered Mental Status (pt lives at home with daughter. Daughter reports for over the last month patient has not been talking, difficulty swallowing, food having to be pureed, has fallen approx 6 times. Pt came from doctors office, pt had to be assisted out of car, not able to assist with transfer. Pt moaning, nonverbal on arrival. )      HPI:  Cecelia Currie is a 80 y.o. female presenting with worsening lethargy and mental status that have been present for a few weeks.  They brought her to the doctor's office today, who told her to come to the ER for further care.  She has been having increasing abdominal swelling.  She can only eat pureed food for the last week or 2.  She has fallen a few times.  She no longer recognizes her daughter.  Today she has set a few sporadic words that were not slurred, but otherwise is not talking.    ROS: As per HPI and below:  Unable to obtain: AMS    Review of patient's allergies indicates:  No Known Allergies    No current facility-administered medications on file prior to encounter.      Current Outpatient Medications on File Prior to Encounter   Medication Sig Dispense Refill    alendronate (FOSAMAX) 70 MG tablet Take 1 tablet (70 mg total) by mouth every 7 days. 12 tablet 3    apixaban (ELIQUIS) 2.5 mg Tab Take 1 tablet (2.5 mg total) by mouth 2 (two) times daily. 60 tablet 11    aspirin (ECOTRIN) 81 MG EC tablet Take 1 tablet (81 mg total) by mouth once daily. 90 tablet 3    BD SAFETY-AGUS TUBERCULIN 1 mL 25 gauge x 5/8" Syrg USE FOR B 12 INJECTIONS 200 each 2    ciclopirox (LOPROX) 0.77 % Crea AAA bid after cool blow dry 30 g 3    cyanocobalamin 1,000 mcg/mL injection Inject 1 mL (1,000 mcg total) into the skin every 30 days. INJECT 1 VIAL IM EVERY MONTH 1 mL 11    donepeziL (ARICEPT) 5 MG tablet Take 1 tablet (5 mg total) by mouth once daily. 90 tablet 3    enalapril (VASOTEC) 2.5 MG tablet Take 1 tablet (2.5 mg " "total) by mouth once daily. 90 tablet 3    ergocalciferol (VITAMIN D2) 50,000 unit Cap Take 1 capsule (50,000 Units total) by mouth every 7 days. 12 capsule 3    fluocinonide 0.05% (LIDEX) 0.05 % cream AAA bid to affected intact skin of right breast and remainder of body as needed; do not use to face or groin 60 g 3    furosemide (LASIX) 20 MG tablet Take 1 tablet (20 mg total) by mouth once daily. Check daily weights. May administer 2nd dose if increased weight or swelling prn 90 tablet 3    latanoprost 0.005 % ophthalmic solution 1 DROP IN EACH EYE EVERY DAY AS DIRECTED 2.5 mL 11    metoprolol succinate (TOPROL-XL) 25 MG 24 hr tablet Take 1 tablet (25 mg total) by mouth once daily. 30 tablet 11    mupirocin (BACTROBAN) 2 % ointment Apply topically 3 (three) times daily. To ulcerated area of right breast 30 g 1    needle, disp, 25 gauge 25 gauge x 1" Ndle For use with B12 injections. 100 each 2    triamcinolone acetonide 0.1% (KENALOG) 0.1 % cream AAA bid after cool blow dry 1 Bottle 3       PMH:  As per HPI and below:  Past Medical History:   Diagnosis Date    Aortic stenosis     Breast cancer 1987    Glaucoma     Glaucoma     Heart disease     Heart failure     HTN (hypertension)     Morbid obesity     MADYSON (obstructive sleep apnea)     Osteoporosis     Tricuspid regurgitation     Vitamin D deficiency     Xerosis of skin      Past Surgical History:   Procedure Laterality Date    CATARACT EXTRACTION      MASTECTOMY Right        Social History     Socioeconomic History    Marital status:      Spouse name: Not on file    Number of children: Not on file    Years of education: Not on file    Highest education level: Not on file   Occupational History    Occupation: retired director     Social Needs    Financial resource strain: Not on file    Food insecurity     Worry: Not on file     Inability: Not on file    Transportation needs     Medical: Not on file     Non-medical: " Not on file   Tobacco Use    Smoking status: Never Smoker    Smokeless tobacco: Never Used   Substance and Sexual Activity    Alcohol use: Not on file    Drug use: No    Sexual activity: Not on file   Lifestyle    Physical activity     Days per week: Not on file     Minutes per session: Not on file    Stress: Not on file   Relationships    Social connections     Talks on phone: Not on file     Gets together: Not on file     Attends [employer] service: Not on file     Active member of club or organization: Not on file     Attends meetings of clubs or organizations: Not on file     Relationship status: Not on file   Other Topics Concern    Are you pregnant or think you may be? Not Asked    Breast-feeding Not Asked   Social History Narrative    Not on file       Family History   Problem Relation Age of Onset    Heart failure Mother     Diabetes Mother     Hypertension Mother     Heart failure Brother     Diabetes Brother     Heart disease Paternal Grandmother     Diabetes Brother     Melanoma Neg Hx     Lupus Neg Hx        Physical Exam:    Vitals:    09/17/20 2300 09/17/20 2330 09/17/20 2336 09/18/20 0050   BP: 107/69 (!) 83/58 95/67    Pulse: (!) 122 (!) 114 110 (!) 114   Resp:    (!) 22   Temp:    98.9 °F (37.2 °C)   TempSrc:    Rectal   SpO2:       Weight:       Height:         Appearance: No acute distress.  Skin: No rashes seen.  Good turgor.  No abrasions.  No ecchymoses.  Eyes: No conjunctival injection.  ENT: Oropharynx clear.    Chest: Clear to auscultation bilaterally.  Good air movement.  No wheezes.  No rhonchi.  Cardiovascular: Irregularly irregular, somewhat tachy.  No murmurs. No gallops. No rubs.  Abdomen: Soft.  Not distended.  Nontender.  No guarding.  No rebound.  Musculoskeletal: No joint swelling, tenderness.  Good ROM.  Neurologic: Doesn't follow commands, but no obvious focal deficits in cranial nerves or extremities.  Mental Status:  Alert, tracks with gaze somewhat, does not  follow commands.      Initial Impression:  AMS, lethargy, failure thrive.  Will do a broad workup and give some fluids given tachycardia and likely dehydration, but will be judicious given history of CHF.  Anticipate admission.    Laboratory Studies:  Labs Reviewed   CBC W/ AUTO DIFFERENTIAL - Abnormal; Notable for the following components:       Result Value    RBC 5.79 (*)     Hemoglobin 16.3 (*)     Hematocrit 51.7 (*)     Mean Corpuscular Hemoglobin Conc 31.5 (*)     RDW 18.6 (*)     All other components within normal limits   COMPREHENSIVE METABOLIC PANEL - Abnormal; Notable for the following components:    Potassium 6.6 (*)     Chloride 114 (*)     CO2 14 (*)     BUN, Bld 119 (*)     Creatinine 2.9 (*)     Albumin 3.4 (*)     Total Bilirubin 1.9 (*)     AST 47 (*)     eGFR if  16.7 (*)     eGFR if non  14.5 (*)     All other components within normal limits   URINALYSIS, REFLEX TO URINE CULTURE - Abnormal; Notable for the following components:    Appearance, UA Cloudy (*)     All other components within normal limits    Narrative:     Specimen Source->Urine   TROPONIN I - Abnormal; Notable for the following components:    Troponin I 0.042 (*)     All other components within normal limits   MAGNESIUM - Abnormal; Notable for the following components:    Magnesium 3.2 (*)     All other components within normal limits   B-TYPE NATRIURETIC PEPTIDE - Abnormal; Notable for the following components:    BNP 2,656 (*)     All other components within normal limits   LACTIC ACID, PLASMA - Abnormal; Notable for the following components:    Lactate (Lactic Acid) 2.7 (*)     All other components within normal limits   PROTIME-INR - Abnormal; Notable for the following components:    Prothrombin Time 17.7 (*)     INR 1.6 (*)     All other components within normal limits   URINALYSIS MICROSCOPIC - Abnormal; Notable for the following components:    Hyaline Casts, UA 37 (*)     All other components  within normal limits    Narrative:     Specimen Source->Urine   ISTAT PROCEDURE - Abnormal; Notable for the following components:    POC  (*)     POC Creatinine 2.7 (*)     POC Potassium 6.8 (*)     POC Chloride 122 (*)     POC TCO2 (MEASURED) 11 (*)     POC Ionized Calcium 0.95 (*)     POC Hematocrit 55 (*)     All other components within normal limits   ISTAT PROCEDURE - Abnormal; Notable for the following components:    POC PH 7.287 (*)     POC PCO2 16.8 (*)     POC PO2 181 (*)     POC HCO3 8.0 (*)     POC TCO2 8 (*)     All other components within normal limits   LACTIC ACID, PLASMA   PHOSPHORUS   SARS-COV-2 RNA AMPLIFICATION, QUAL   CALCIUM, IONIZED   CK   BASIC METABOLIC PANEL   TROPONIN I   TSH   VITAMIN B12   BASIC METABOLIC PANEL   MAGNESIUM   PHOSPHORUS   B-TYPE NATRIURETIC PEPTIDE   CBC W/ AUTO DIFFERENTIAL   POCT GLUCOSE   POCT GLUCOSE   POCT GLUCOSE   ISTAT CHEM8   POCT GLUCOSE MONITORING CONTINUOUS       I decided to obtain the patient's medical records.    Imaging Results          CT Head Without Contrast (Final result)  Result time 09/17/20 22:01:48    Final result by Lance Kelley MD (09/17/20 22:01:48)                 Impression:      Stable CT of the brain since 2018 with moderate involutional changes.    No evidence of acute hemorrhage, mass or infarction.      Electronically signed by: Lance Kelley  Date:    09/17/2020  Time:    22:01             Narrative:    EXAMINATION:  CT HEAD WITHOUT CONTRAST    CLINICAL HISTORY:  Altered mental status;    TECHNIQUE:  Low dose axial images were obtained through the head.  Coronal and sagittal reformations were also performed. Contrast was not administered.    COMPARISON:  CT of the brain, 04/04/2018 at 16:42 hours    FINDINGS:  Generalized volume loss manifested by prominence of the cortical sulcal markings basilar cisterns and ventricular system remain.  There is no new loss of gray-white matter junction differentiation, mass or mass  effect.  There is no intra or extra-axial pathologic fluid collection.    The ventricular system and basilar cisterns remain stable.    Bilateral ocular lens replacements are noted.  Bony structures are intact.  Air cells appear clear                               X-Ray Chest AP Portable (Final result)  Result time 09/17/20 19:07:31    Final result by Alexander Weber MD (09/17/20 19:07:31)                 Impression:      Cardiomegaly with small bilateral pleural effusions and pulmonary vascular congestion, suggestive of underlying pulmonary edema secondary to CHF.      Electronically signed by: Alexander Weber MD  Date:    09/17/2020  Time:    19:07             Narrative:    EXAMINATION:  XR CHEST AP PORTABLE    CLINICAL HISTORY:  weakness;    TECHNIQUE:  Single frontal view of the chest was performed.    COMPARISON:  02/07/2019.    FINDINGS:  Monitoring EKG leads are present.  The trachea is unremarkable.  There are calcifications of the aortic knob.  There is stable enlargement of the cardiomediastinal silhouette.  There are small bilateral pleural effusions.  These are slightly increased compared to the prior examination.  There is no evidence of a pneumothorax.  There is no evidence of pneumomediastinum.  There is pulmonary vascular congestion.  No focal consolidation is present.  There are degenerative changes in the osseous structures.                                Medications Given:  Medications   sodium zirconium cyclosilicate packet 10 g (10 g Oral Not Given 9/17/20 2015)   apixaban tablet 2.5 mg (2.5 mg Oral Not Given 9/17/20 2200)   aspirin EC tablet 81 mg (has no administration in time range)   mupirocin 2 % ointment (has no administration in time range)   donepeziL tablet 5 mg (has no administration in time range)   sodium chloride 0.9% flush 10 mL (has no administration in time range)   potassium chloride 10 mEq in 100 mL IVPB (has no administration in time range)     And   potassium chloride 10 mEq in 100  mL IVPB (has no administration in time range)     And   potassium chloride 10 mEq in 100 mL IVPB (has no administration in time range)   magnesium sulfate 2g in water 50mL IVPB (premix) (has no administration in time range)   magnesium sulfate 2g in water 50mL IVPB (premix) (has no administration in time range)   sodium phosphate 15 mmol in dextrose 5 % 250 mL IVPB (has no administration in time range)   sodium phosphate 20.01 mmol in dextrose 5 % 250 mL IVPB (has no administration in time range)   sodium phosphate 30 mmol in dextrose 5 % 250 mL IVPB (has no administration in time range)   calcium gluconate 1g in dextrose 5% 100mL (ready to mix system) (has no administration in time range)   calcium gluconate 2 g in dextrose 5 % 100 mL IVPB (has no administration in time range)   calcium gluconate 3 g in dextrose 5 % 100 mL IVPB (has no administration in time range)   acetaminophen tablet 650 mg (has no administration in time range)   ondansetron injection 4 mg (has no administration in time range)   famotidine (PF) injection 20 mg (has no administration in time range)   metoprolol tartrate (LOPRESSOR) tablet 25 mg (has no administration in time range)   sodium chloride 0.9% bolus 1,000 mL (0 mLs Intravenous Stopped 9/17/20 2026)   albuterol sulfate nebulizer solution 10 mg (10 mg Nebulization Given 9/17/20 2105)   calcium gluconate 1g in dextrose 5% 100mL (ready to mix system) (0 g Intravenous Stopped 9/17/20 2243)   insulin regular injection 10 Units (10 Units Intravenous Given 9/17/20 2052)   dextrose 50% injection 25 g (25 g Intravenous Given 9/17/20 2049)   sodium chloride 0.9% bolus 500 mL (0 mLs Intravenous Stopped 9/18/20 0050)   sodium chloride 0.9% bolus 500 mL (0 mLs Intravenous Stopped 9/18/20 0050)       ED Course:  ED Course as of Sep 18 0109   Thu Sep 17, 2020   1824 POCT Glucose: 106 [DC]   1904 WBC: 7.06 [DC]   1904 Hemoglobin(!): 16.3 [DC]   1905 Platelets: 286 [DC]   1905 Atrial fibrillation/RVR,  118 bpm, no acute ischemia per my independent interpretation.       EKG 12-lead [DC]   1905 Lactate, Jeanmarie: 2.1 [DC]   1912 Volume overload per my independent interpretation.       X-Ray Chest AP Portable [DC]   1912 Will have nurse stop fluids at 500 -- hgb of 16.3 suggests dehydration/hemoconcentration     [DC]   1925 baseline   Troponin I(!): 0.042 [DC]   1925 3059 one year ago   BNP(!): 2,656 [DC]   1959 Potassium(!!): 6.6 [DC]   1959 Baseline 1.1   Creatinine(!): 2.9 [DC]   1959 BUN, Bld(!): 119 [DC]   1959 Magnesium(!): 3.2 [DC]   2012 Discussed with critical care fellow    [DC]   2029 SARS-CoV-2 RNA, Amplification, Qual: Negative [DC]   2029 POC BUN(!): 118 [DC]   2029 POC Creatinine(!): 2.7 [DC]   2029 Hyperkalemia med panel ordered   POC Potassium(!!): 6.8 [DC]      ED Course User Index  [DC] Lance Oh MD              Critical Care:  Date: 09/18/2020  Performed by: Lance Oh MD  Authorized by: Lance Oh MD   Total critical care time (exclusive of procedural time) : 60. minutes  Critical care was necessary to treat or prevent imminent or life-threatening deterioration of the following conditions:  Afib, meera, hyperkalemia, uremia    MDM:    80 y.o. female with failure to thrive for a few weeks, AMS, decreased PO intake.  Labs show what is likely profound hypovolemia along with hyperkalemia and uremia.  MEERA noted.  Will need fluid resuscitation slowly given h/o CHF.  ICU consulted for admission.      Diagnostic Impression:    1. Uremia    2. Weakness    3. MEERA (acute kidney injury)    4. Hyperkalemia    5. Atrial fibrillation with RVR         ED Disposition Condition    Admit                   Lance Oh MD  09/18/20 0109

## 2020-09-17 NOTE — ED TRIAGE NOTES
Cecelia Currie, a 80 y.o. female presents to the ED via personal transportation from home w/ complaint of increased weakness, difficultly swallowing, overall decrease in functioning    Triage note:  Chief Complaint   Patient presents with    Altered Mental Status     pt lives at home with daughter. Daughter reports for over the last month patient has not been talking, difficulty swallowing, food having to be pureed, has fallen approx 6 times. Pt came from doctors office, pt had to be assisted out of car, not able to assist with transfer. Pt moaning, nonverbal on arrival.      Review of patient's allergies indicates:  No Known Allergies  Past Medical History:   Diagnosis Date    Aortic stenosis     Breast cancer 1987    Glaucoma     Glaucoma     Heart disease     Heart failure     HTN (hypertension)     Morbid obesity     MADYSON (obstructive sleep apnea)     Osteoporosis     Tricuspid regurgitation     Vitamin D deficiency     Xerosis of skin

## 2020-09-17 NOTE — PROGRESS NOTES
"Subjective:    Patient ID:  Cecelia Currie is a 80 y.o. female who presents for follow-up of shortness of breath, severe mitral regurgitation    HPI   The patient is a 80 year old female was under the care of Dr Pham at [facility] with known severe mitral regurgitation and mod-severe tricuspid regurgitation with EF 40-45% in January 2018. She was seen in clinic as a new patient 7/23/20 and her daughter reported increase in SOB. She was noted on that visit and was placed on eliquis and was to return in a few days. She presents today very lethargic and non responsive          Lab Results   Component Value Date     05/26/2020    K 5.1 05/26/2020     05/26/2020    CO2 23 05/26/2020    BUN 28 (H) 05/26/2020    CREATININE 1.3 05/26/2020     05/26/2020    HGBA1C 5.4 05/26/2020    MG 2.1 02/09/2019    AST 29 05/26/2020    ALT 18 05/26/2020    ALBUMIN 4.1 05/26/2020    PROT 8.9 (H) 05/26/2020    BILITOT 0.5 05/26/2020    WBC 9.10 05/26/2020    HGB 13.9 05/26/2020    HCT 45.8 05/26/2020    MCV 96 05/26/2020     05/26/2020    TSH 1.483 05/26/2020         Lab Results   Component Value Date    CHOL 268 (H) 05/26/2020    HDL 54 05/26/2020    TRIG 164 (H) 05/26/2020       Lab Results   Component Value Date    LDLCALC 181.2 (H) 05/26/2020       Past Medical History:   Diagnosis Date    Aortic stenosis     Breast cancer 1987    Glaucoma     Glaucoma     Heart disease     Heart failure     HTN (hypertension)     Morbid obesity     MADYSON (obstructive sleep apnea)     Osteoporosis     Tricuspid regurgitation     Vitamin D deficiency     Xerosis of skin        Current Outpatient Medications:     alendronate (FOSAMAX) 70 MG tablet, Take 1 tablet (70 mg total) by mouth every 7 days., Disp: 12 tablet, Rfl: 3    apixaban (ELIQUIS) 2.5 mg Tab, Take 1 tablet (2.5 mg total) by mouth 2 (two) times daily., Disp: 60 tablet, Rfl: 11    BD SAFETY-AGUS TUBERCULIN 1 mL 25 gauge x 5/8" Syrg, USE FOR B 12 " "INJECTIONS, Disp: 200 each, Rfl: 2    ciclopirox (LOPROX) 0.77 % Crea, AAA bid after cool blow dry, Disp: 30 g, Rfl: 3    cyanocobalamin 1,000 mcg/mL injection, Inject 1 mL (1,000 mcg total) into the skin every 30 days. INJECT 1 VIAL IM EVERY MONTH, Disp: 1 mL, Rfl: 11    donepeziL (ARICEPT) 5 MG tablet, Take 1 tablet (5 mg total) by mouth once daily., Disp: 90 tablet, Rfl: 3    enalapril (VASOTEC) 2.5 MG tablet, Take 1 tablet (2.5 mg total) by mouth once daily., Disp: 90 tablet, Rfl: 3    ergocalciferol (VITAMIN D2) 50,000 unit Cap, Take 1 capsule (50,000 Units total) by mouth every 7 days., Disp: 12 capsule, Rfl: 3    fluocinonide 0.05% (LIDEX) 0.05 % cream, AAA bid to affected intact skin of right breast and remainder of body as needed; do not use to face or groin, Disp: 60 g, Rfl: 3    furosemide (LASIX) 20 MG tablet, Take 1 tablet (20 mg total) by mouth once daily. Check daily weights. May administer 2nd dose if increased weight or swelling prn, Disp: 90 tablet, Rfl: 3    latanoprost 0.005 % ophthalmic solution, 1 DROP IN EACH EYE EVERY DAY AS DIRECTED, Disp: 2.5 mL, Rfl: 11    metoprolol succinate (TOPROL-XL) 25 MG 24 hr tablet, Take 1 tablet (25 mg total) by mouth once daily., Disp: 30 tablet, Rfl: 11    mupirocin (BACTROBAN) 2 % ointment, Apply topically 3 (three) times daily. To ulcerated area of right breast, Disp: 30 g, Rfl: 1    needle, disp, 25 gauge 25 gauge x 1" Ndle, For use with B12 injections., Disp: 100 each, Rfl: 2    triamcinolone acetonide 0.1% (KENALOG) 0.1 % cream, AAA bid after cool blow dry, Disp: 1 Bottle, Rfl: 3    aspirin (ECOTRIN) 81 MG EC tablet, Take 1 tablet (81 mg total) by mouth once daily., Disp: 90 tablet, Rfl: 3          Review of Systems   Constitution: Negative for decreased appetite, diaphoresis, fever, malaise/fatigue, weight gain and weight loss.   HENT: Negative for congestion, ear discharge, ear pain and nosebleeds.    Eyes: Negative for blurred vision, " double vision and visual disturbance.   Cardiovascular: Negative for chest pain, claudication, cyanosis, dyspnea on exertion, irregular heartbeat, leg swelling, near-syncope, orthopnea, palpitations, paroxysmal nocturnal dyspnea and syncope.   Respiratory: Negative for cough, hemoptysis, shortness of breath, sleep disturbances due to breathing, snoring, sputum production and wheezing.    Endocrine: Negative for polydipsia, polyphagia and polyuria.   Hematologic/Lymphatic: Negative for adenopathy and bleeding problem. Does not bruise/bleed easily.   Skin: Negative for color change, nail changes, poor wound healing and rash.   Musculoskeletal: Negative for muscle cramps and muscle weakness.   Gastrointestinal: Negative for abdominal pain, anorexia, change in bowel habit, hematochezia, nausea and vomiting.   Genitourinary: Negative for dysuria, frequency and hematuria.   Neurological: Negative for brief paralysis, difficulty with concentration, excessive daytime sleepiness, dizziness, focal weakness, headaches, light-headedness, seizures, vertigo and weakness.   Psychiatric/Behavioral: Positive for altered mental status and memory loss. Negative for depression.   Allergic/Immunologic: Negative for persistent infections.        Objective:BP (!) 88/60   Pulse 78   Ht 5' (1.524 m)   Wt 81.6 kg (180 lb)   SpO2 (!) 92%   BMI 35.15 kg/m²             Physical Exam   Constitutional: She is oriented to person, place, and time. She appears well-developed and well-nourished.   HENT:   Head: Normocephalic.   Right Ear: External ear normal.   Left Ear: External ear normal.   Nose: Nose normal.   Inspection of lips, teeth and gums normal   Eyes: Pupils are equal, round, and reactive to light. Conjunctivae and EOM are normal. No scleral icterus.   Neck: Normal range of motion. No JVD present. No tracheal deviation present. No thyromegaly present.   Cardiovascular: Normal rate, regular rhythm, S1 normal, S2 normal and intact  distal pulses. Exam reveals no gallop and no friction rub.   No murmur heard.  Trace edema   Pulmonary/Chest: Effort normal and breath sounds normal. No respiratory distress. She has no wheezes. She has no rales. She exhibits no tenderness.   Abdominal: Soft. Bowel sounds are normal. She exhibits no distension. There is no hepatosplenomegaly. There is no abdominal tenderness. There is no guarding.       Musculoskeletal: Normal range of motion.         General: No tenderness or edema.   Lymphadenopathy:   Palpation of lymph nodes of neck and groin normal   Neurological: She is oriented to person, place, and time. No cranial nerve deficit. She exhibits normal muscle tone. Coordination normal.   Skin: Skin is warm and dry. No rash noted. No erythema. No pallor.   No ankle nor pretibial edema   Psychiatric: She has a normal mood and affect. Her behavior is normal. Judgment and thought content normal.         Assessment:       1. Hypotension, unspecified hypotension type    2. Non-rheumatic mitral regurgitation    3. Acute on chronic systolic congestive heart failure    4. Atrial fibrillation, unspecified type         Plan:       Cecelia was seen today for atrial fibrillation.    Diagnoses and all orders for this visit:    Hypotension, unspecified hypotension type    Non-rheumatic mitral regurgitation    Acute on chronic systolic congestive heart failure    Atrial fibrillation, unspecified type         To ED for admit
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
## 2020-09-18 NOTE — PROGRESS NOTES
Pharmacist Renal Dose Adjustment Note    Cecelia Currie is a 80 y.o. female being treated with the medication famotidine.    Patient Data:    Vital Signs (Most Recent):  Temp: (!) 93.8 °F (34.3 °C) (09/18/20 0701)  Pulse: 98 (09/18/20 0731)  Resp: 16 (09/18/20 0710)  BP: 98/75 (09/18/20 0708)  SpO2: 100 % (09/18/20 0731)   Vital Signs (72h Range):  Temp:  [92.8 °F (33.8 °C)-97.6 °F (36.4 °C)]   Pulse:  []   Resp:  [16-22]   BP: ()/(30-77)   SpO2:  [53 %-100 %]      Recent Labs   Lab 09/17/20  1834 09/18/20  0110 09/18/20  0320   CREATININE 2.9* 2.2* 2.8*     Serum creatinine: 2.8 mg/dL (H) 09/18/20 0320  Estimated creatinine clearance: 14.6 mL/min (A)    Famotidine 20 mg q48h will be changed to famotidine 20 mg q24h.    Pharmacist's Name: Emma Cohn  Pharmacist's Extension: [phone]

## 2020-09-18 NOTE — ASSESSMENT & PLAN NOTE
K on admission 6.6  - EKG showed no significant changes consistent with hyperkalemia  - Given calcium, insulin/d50, albuterol, will repeat BMP

## 2020-09-18 NOTE — NURSING
Pt arrived to unit at 1330, on portable monitor and IV pump. Pt transferred to ICU [id number] without difficulty. Pt placed on CMICU monitor, displays atrial fibrillation rhythm with RVR at rate of 110s-130s sustained. Team aware.

## 2020-09-18 NOTE — ED NOTES
CC team informed of BP of 65/30 MAP 42. CC will order levo pt hypoxic at 83-88% on RA. Pt placed on 2L NC. Will monitor closely.

## 2020-09-18 NOTE — TELEPHONE ENCOUNTER
09/18/2020 1:42 PM     Called patient's documented emergency contact:    Extended Emergency Contact Information  Primary Emergency Contact: Prisca Shelton   [place]  Mobile Phone: [phone]  Relation: Daughter    To update emergency contact regarding patient's ongoing care while admitted in the hospital. Emergency contact understands that patient has Biventricular heart failure; MADYSON (obstructive sleep apnea); Class 2 severe obesity due to excess calories with serious comorbidity and body mass index (BMI) of 35.0 to 35.9 in adult; Vitamin D insufficiency; Glaucoma; Osteoporosis; Memory loss; Essential hypertension; Non-rheumatic mitral regurgitation; Influenza A; Acute on chronic systolic congestive heart failure; Elevated troponin; TIMOTEO (acute kidney injury); CAD (coronary artery disease); Chronic combined systolic and diastolic heart failure; Beta-blocker intolerance; Coronary artery disease of native artery of native heart with stable angina pectoris; Vitamin B12 deficiency; Vascular dementia without behavioral disturbance; Aortic valve stenosis; Mixed hyperlipidemia; H/O total mastectomy of right breast; Lichen simplex chronicus; Intertrigo; Impaired functional mobility, balance, gait, and endurance; Current use of long term anticoagulation; Pulmonary hypertension; Atrial fibrillation; Hyperkalemia; NSTEMI (non-ST elevated myocardial infarction); and Uremia on their problem list..    Specifically addressed during phone call:    Patient's declining status, sudden drop in BP. Patient on Levophed peripherally. Advised to come to hospital ASAP due to patient's poor prognosis and acute change in BP. Daughter understands.    All questions were answered. Will continue to update emergency contact/ family member daily or if clinical picture acutely changes.      Jonathan Chowdhuyr MD

## 2020-09-18 NOTE — CARE UPDATE
Rapid Response Nurse Chart Check     Chart check completed, abnormal VS noted. Plan to admit patient to MICU service. Please call [phone] for further concerns or assistance.

## 2020-09-18 NOTE — SIGNIFICANT EVENT
09/18/2020 1:13 PM     Nursing staff called and was informed that patient with acute drop in BP, noted to be 60's/20s. Started Levophed.     I assessed and examined patient at bedside. Patient non-verbal, responding to pain. Seems chronically overloaded. No change since admit.     BP improved with MAP goal of 65, currently at rate of 1.4    Patient with advanced directive on file, attempted to call emergency contact - no answer, left message. Will attempt to call back.    Jonathan Chowdhury MD

## 2020-09-18 NOTE — ASSESSMENT & PLAN NOTE
- TTE 6/2018 EF 20--25%  - BNP 2656, 3000 on previous admission 1 year ago  - CXR shows cardiomegaly and vascular congestion  - appears intravascularly volume down, will give fluids in small aliquots and reassess  - Continuing home metoprolol and aspirin

## 2020-09-18 NOTE — SUBJECTIVE & OBJECTIVE
Past Medical History:   Diagnosis Date    Aortic stenosis     Breast cancer 1987    Glaucoma     Glaucoma     Heart disease     Heart failure     HTN (hypertension)     Morbid obesity     MADYSON (obstructive sleep apnea)     Osteoporosis     Tricuspid regurgitation     Vitamin D deficiency     Xerosis of skin        Past Surgical History:   Procedure Laterality Date    CATARACT EXTRACTION      MASTECTOMY Right        Review of patient's allergies indicates:  No Known Allergies    Family History     Problem Relation (Age of Onset)    Diabetes Mother, Brother, Brother    Heart disease Paternal Grandmother    Heart failure Mother, Brother    Hypertension Mother        Tobacco Use    Smoking status: Never Smoker    Smokeless tobacco: Never Used   Substance and Sexual Activity    Alcohol use: Not on file    Drug use: No    Sexual activity: Not on file      Review of Systems   Unable to perform ROS: Dementia     Objective:     Vital Signs (Most Recent):  Temp: 97.6 °F (36.4 °C) (09/17/20 1804)  Pulse: (!) 124 (09/17/20 2105)  Resp: 20 (09/17/20 2105)  BP: (!) 81/46 (09/17/20 2105)  SpO2: 100 % (09/17/20 2105) Vital Signs (24h Range):  Temp:  [97.6 °F (36.4 °C)] 97.6 °F (36.4 °C)  Pulse:  [] 124  Resp:  [20] 20  SpO2:  [53 %-100 %] 100 %  BP: ()/(46-77) 81/46   Weight: 81.6 kg (179 lb 14.3 oz)  Body mass index is 35.13 kg/m².      Intake/Output Summary (Last 24 hours) at 9/17/2020 2130  Last data filed at 9/17/2020 2026  Gross per 24 hour   Intake 500 ml   Output --   Net 500 ml       Physical Exam  Vitals signs reviewed.   Constitutional:       Appearance: She is ill-appearing.   HENT:      Head: Atraumatic.      Right Ear: External ear normal.      Left Ear: External ear normal.      Nose: Nose normal.      Mouth/Throat:      Pharynx: Posterior oropharyngeal erythema (mucosal erythema) present.   Eyes:      Extraocular Movements: Extraocular movements intact.      Pupils: Pupils are equal,  round, and reactive to light.   Neck:      Musculoskeletal: Normal range of motion and neck supple. No muscular tenderness.   Cardiovascular:      Rate and Rhythm: Tachycardia present. Rhythm irregular.      Pulses: Normal pulses.      Heart sounds: Normal heart sounds.   Pulmonary:      Breath sounds: No rhonchi or rales.      Comments: tachypnea  Abdominal:      Tenderness: There is no abdominal tenderness. There is no guarding or rebound.   Musculoskeletal: Normal range of motion.         General: No tenderness.      Right lower leg: No edema.      Left lower leg: No edema.   Skin:     Capillary Refill: Capillary refill takes less than 2 seconds.      Coloration: Skin is not jaundiced or pale.   Neurological:      General: No focal deficit present.      Mental Status: She is alert and oriented to person, place, and time.         Vents:     Lines/Drains/Airways     Peripheral Intravenous Line                 Peripheral IV - Single Lumen 09/17/20 1827 20 G Right Antecubital less than 1 day         Peripheral IV - Single Lumen 09/17/20 1907 20 G Left Antecubital less than 1 day              Significant Labs:    CBC/Anemia Profile:  Recent Labs   Lab 09/17/20 1834 09/17/20 2008   WBC 7.06  --    HGB 16.3*  --    HCT 51.7* 55*     --    MCV 89  --    RDW 18.6*  --         Chemistries:  Recent Labs   Lab 09/17/20 1834      K 6.6*   *   CO2 14*   *   CREATININE 2.9*   CALCIUM 10.0   ALBUMIN 3.4*   PROT 8.1   BILITOT 1.9*   ALKPHOS 131   ALT 23   AST 47*   MG 3.2*   PHOS 4.4       All pertinent labs within the past 24 hours have been reviewed.    Significant Imaging: I have reviewed all pertinent imaging results/findings within the past 24 hours.

## 2020-09-18 NOTE — PLAN OF CARE
Problem: SLP Goal  Goal: SLP Goal  Description: Speech Language Pathology Goals  Goals expected to be met by 9/25    1. Pt will participate in ongoing swallow assessment          Outcome: Ongoing, Progressing   Bedside swallow study completed. Recommend NPO diet at this time.   Brenna Cooley CCC-SLP  9/18/2020

## 2020-09-18 NOTE — ASSESSMENT & PLAN NOTE
- Hx of multiple falls  - pending head CT  - holding eliquis and aspirin prior to head CT if patient has ICH  - no acute changes in mental status or balance in last few weeks

## 2020-09-18 NOTE — ASSESSMENT & PLAN NOTE
- irregular rate 100-130's  - will start home metoprolol  - not in afib RVR, rate likely compensatory to intravascular hypovolemia

## 2020-09-18 NOTE — PROGRESS NOTES
Pharmacokinetic Initial Assessment: IV Vancomycin    Assessment/Plan:    Initiate intravenous vancomycin with loading dose of 1750 mg once with subsequent doses when random concentrations are less than 20 mcg/mL  Desired empiric serum trough concentration is 15 to 20 mcg/mL  Draw vancomycin random level on 9/18 at 1400.  Pharmacy will continue to follow and monitor vancomycin.      Please contact pharmacy at extension [phone] with any questions regarding this assessment.     Thank you for the consult,   Trinidad Denson       Patient brief summary:  Cecelia Currie is a 80 y.o. female initiated on antimicrobial therapy with IV Vancomycin for treatment of suspected bacteremia    Drug Allergies:   Review of patient's allergies indicates:  No Known Allergies    Actual Body Weight:   81.6 kg    Renal Function:   Estimated Creatinine Clearance: 14.1 mL/min (A) (based on SCr of 2.9 mg/dL (H)).,     Dialysis Method (if applicable):  N/A    CBC (last 72 hours):  Recent Labs   Lab Result Units 09/17/20  1834   WBC K/uL 7.06   Hemoglobin g/dL 16.3*   Hematocrit % 51.7*   Platelets K/uL 286   Gran% % 59.9   Lymph% % 28.0   Mono% % 9.3   Eosinophil% % 1.8   Basophil% % 0.6   Differential Method  Automated       Metabolic Panel (last 72 hours):  Recent Labs   Lab Result Units 09/17/20  1834 09/17/20  2207   Sodium mmol/L 140  --    Potassium mmol/L 6.6*  --    Chloride mmol/L 114*  --    CO2 mmol/L 14*  --    Glucose mg/dL 100  --    Glucose, UA   --  Negative   BUN, Bld mg/dL 119*  --    Creatinine mg/dL 2.9*  --    Albumin g/dL 3.4*  --    Total Bilirubin mg/dL 1.9*  --    Alkaline Phosphatase U/L 131  --    AST U/L 47*  --    ALT U/L 23  --    Magnesium mg/dL 3.2*  --    Phosphorus mg/dL 4.4  --        Drug levels (last 3 results):  No results for input(s): VANCOMYCINRA, VANCOMYCINPE, VANCOMYCINTR in the last 72 hours.    Microbiologic Results:  Microbiology Results (last 7 days)     Procedure Component Value Units Date/Time     Blood culture [[id number]]     Order Status: No result Specimen: Blood     Blood culture [[id number]]     Order Status: No result Specimen: Blood

## 2020-09-18 NOTE — PT/OT/SLP EVAL
Speech Language Pathology Evaluation  Bedside Swallow    Patient Name:  Cecelia Currie   MRN:  [id number]  Admitting Diagnosis: <principal problem not specified>    Recommendations:                 General Recommendations:  Dysphagia therapy  Diet recommendations:  NPO, NPO   Aspiration Precautions: Strict aspiration precautions   General Precautions: Standard, aspiration  Communication strategies:  pt nonverbal throughout eval     History:     Past Medical History:   Diagnosis Date    Aortic stenosis     Breast cancer 1987    Glaucoma     Glaucoma     Heart disease     Heart failure     HTN (hypertension)     Morbid obesity     MADYSON (obstructive sleep apnea)     Osteoporosis     Tricuspid regurgitation     Vitamin D deficiency     Xerosis of skin        Past Surgical History:   Procedure Laterality Date    CATARACT EXTRACTION      MASTECTOMY Right        Social History: Patient lives with family.    Prior diet: puree/thin per chart review.    Subjective     Awake/alert    Pain/Comfort:  · Pain Rating 1: 0/10  · Pain Rating Post-Intervention 1: 0/10    Objective:     Oral Musculature Evaluation  · Oral Musculature: unable to assess due to poor participation/comprehension  · Dentition: edentulous    Bedside Swallow Eval:   Consistencies Assessed:  · Thin liquids cup x2  · Puree x1     Oral Phase:   · oral holding SLP removed pureed bolus from oral cavity  · Slow oral transit time  · Straw sip attempted; however pt unable to propel bolus through straw    Pharyngeal Phase:   · Pt with questionable delayed swallow initiaiton with thin liquid trials.   · coughing/choking noted post trials  · No pharyngeal swallow initiated with puree    Assessment:     Cecelia Currie is a 80 y.o. female with an SLP diagnosis of Dysphagia.    Goals:   Multidisciplinary Problems     SLP Goals        Problem: SLP Goal    Goal Priority Disciplines Outcome   SLP Goal     SLP Ongoing, Progressing   Description: Speech  Language Pathology Goals  Goals expected to be met by 9/25    1. Pt will participate in ongoing swallow assessment                           Plan:     · Patient to be seen:  4 x/week   · Plan of Care reviewed with:  patient   · SLP Follow-Up:  Yes           Time Tracking:     SLP Treatment Date:   09/18/20  Speech Start Time:  0928  Speech Stop Time:  0933     Speech Total Time (min):  5 min    Billable Minutes: Eval Swallow and Oral Function 5    Brenna Cooley CCC-SLP  09/18/2020

## 2020-09-18 NOTE — ASSESSMENT & PLAN NOTE
- Creatinine 2.9  - K+ 6.6, repeat BMP s/p shifting, Bicarb 14, pH 7.28\  - Likely 2/2 cardiorenal vs intravascular hypovolemia  - will reassess with repeat BMP after IVF  - hold vasotec and all other nephrotoxic meds  - venegas placed for accurate IO's

## 2020-09-18 NOTE — H&P
[facility]  Critical Care Medicine  History & Physical    Patient Name: Cecelia Currie  MRN: [id number]  Admission Date: 9/17/2020  Hospital Length of Stay: 0 days  Code Status: DNR  Attending Physician: Randall Ledesam MD   Primary Care Provider: Tosin Wharton MD   Principal Problem: <principal problem not specified>    Subjective:     HPI:  79 y/o F with hx of CHF systolic and diastolic dysfunction (TTE 6/2018 LVEF 20-25%, Mitral and tricuspid valve regurgitation), CAD, HTN, Dementia, who presents to the ED with approximately 6 weeks of progressive decline in functional status. History is obtained from daughter who is primary caretaker and noticed that she has had decreased mental status, decreased PO intake with difficulty swallowing, multiple falls, decreased speech and abdominal swelling. She does not report any head trauma or acute changes in mental status. Was started on eliquis and metoprolol in July but no other medication changes. Patient's physical exam notable for sporadic speech, appears chronically ill, mucous membrane erythema in lips but no other rashes, minimal bilateral peripheral edema, irregular HR, tachypnic    In the ED patient's workup is notable for HR irregular at a rate of 100-130, relative hypotension, hyperkalemia shifted in ED without significant EKG changes, Bicarb 14, AG 12, Creatinine 2.9, BNP 2656, Troponin 0.042, & Mg 3.2, lactic 2.1 ABG - pH 7.28, pCO2 17. CBC, Phosphorus, Covid, unremarkable. CXR shows pulmonary edema and cardiomegaly    Hospital/ICU Course:  No notes on file     Past Medical History:   Diagnosis Date    Aortic stenosis     Breast cancer 1987    Glaucoma     Glaucoma     Heart disease     Heart failure     HTN (hypertension)     Morbid obesity     MADYSON (obstructive sleep apnea)     Osteoporosis     Tricuspid regurgitation     Vitamin D deficiency     Xerosis of skin        Past Surgical History:   Procedure  Laterality Date    CATARACT EXTRACTION      MASTECTOMY Right        Review of patient's allergies indicates:  No Known Allergies    Family History     Problem Relation (Age of Onset)    Diabetes Mother, Brother, Brother    Heart disease Paternal Grandmother    Heart failure Mother, Brother    Hypertension Mother        Tobacco Use    Smoking status: Never Smoker    Smokeless tobacco: Never Used   Substance and Sexual Activity    Alcohol use: Not on file    Drug use: No    Sexual activity: Not on file      Review of Systems   Unable to perform ROS: Dementia     Objective:     Vital Signs (Most Recent):  Temp: 97.6 °F (36.4 °C) (09/17/20 1804)  Pulse: (!) 124 (09/17/20 2105)  Resp: 20 (09/17/20 2105)  BP: (!) 81/46 (09/17/20 2105)  SpO2: 100 % (09/17/20 2105) Vital Signs (24h Range):  Temp:  [97.6 °F (36.4 °C)] 97.6 °F (36.4 °C)  Pulse:  [] 124  Resp:  [20] 20  SpO2:  [53 %-100 %] 100 %  BP: ()/(46-77) 81/46   Weight: 81.6 kg (179 lb 14.3 oz)  Body mass index is 35.13 kg/m².      Intake/Output Summary (Last 24 hours) at 9/17/2020 2130  Last data filed at 9/17/2020 2026  Gross per 24 hour   Intake 500 ml   Output --   Net 500 ml       Physical Exam  Vitals signs reviewed.   Constitutional:       Appearance: She is ill-appearing.   HENT:      Head: Atraumatic.      Right Ear: External ear normal.      Left Ear: External ear normal.      Nose: Nose normal.      Mouth/Throat:      Pharynx: Posterior oropharyngeal erythema (mucosal erythema) present.   Eyes:      Extraocular Movements: Extraocular movements intact.      Pupils: Pupils are equal, round, and reactive to light.   Neck:      Musculoskeletal: Normal range of motion and neck supple. No muscular tenderness.   Cardiovascular:      Rate and Rhythm: Tachycardia present. Rhythm irregular.      Pulses: Normal pulses.      Heart sounds: Normal heart sounds.   Pulmonary:      Breath sounds: No rhonchi or rales.      Comments: tachypnea  Abdominal:       Tenderness: There is no abdominal tenderness. There is no guarding or rebound.   Musculoskeletal: Normal range of motion.         General: No tenderness.      Right lower leg: No edema.      Left lower leg: No edema.   Skin:     Capillary Refill: Capillary refill takes less than 2 seconds.      Coloration: Skin is not jaundiced or pale.   Neurological:      General: No focal deficit present.      Mental Status: She is alert and oriented to person, place, and time.         Vents:     Lines/Drains/Airways     Peripheral Intravenous Line                 Peripheral IV - Single Lumen 09/17/20 1827 20 G Right Antecubital less than 1 day         Peripheral IV - Single Lumen 09/17/20 1907 20 G Left Antecubital less than 1 day              Significant Labs:    CBC/Anemia Profile:  Recent Labs   Lab 09/17/20 1834 09/17/20 2008   WBC 7.06  --    HGB 16.3*  --    HCT 51.7* 55*     --    MCV 89  --    RDW 18.6*  --         Chemistries:  Recent Labs   Lab 09/17/20 1834      K 6.6*   *   CO2 14*   *   CREATININE 2.9*   CALCIUM 10.0   ALBUMIN 3.4*   PROT 8.1   BILITOT 1.9*   ALKPHOS 131   ALT 23   AST 47*   MG 3.2*   PHOS 4.4       All pertinent labs within the past 24 hours have been reviewed.    Significant Imaging: I have reviewed all pertinent imaging results/findings within the past 24 hours.    Assessment/Plan:     Cardiac/Vascular  NSTEMI (non-ST elevated myocardial infarction)  Troponin 0.042   - repeat trop in 4 hours    Atrial fibrillation  - irregular rate 100-130's  - will start home metoprolol  - not in afib RVR, rate likely compensatory to intravascular hypovolemia    CAD (coronary artery disease)  - on aspirin 82  - troponin 0.042, repeat trop in 4 hours    Essential hypertension  Held vasotec and lasix in setting of TIMOTEO    Biventricular heart failure  - TTE 6/2018 EF 20--25%  - BNP 2656, 3000 on previous admission 1 year ago  - CXR shows cardiomegaly and vascular congestion  - appears  intravascularly volume down, will give fluids in small aliquots and reassess  - Continuing home metoprolol and aspirin    Renal/  Uremia  Bun 119  - Creatinine 2.9  - will continue to trend with BMP    Hyperkalemia  K on admission 6.6  - EKG showed no significant changes consistent with hyperkalemia  - Given calcium, insulin/d50, albuterol, will repeat BMP      TIMOTEO (acute kidney injury)  - Creatinine 2.9  - K+ 6.6, repeat BMP s/p shifting, Bicarb 14, pH 7.28\  - Likely 2/2 cardiorenal vs intravascular hypovolemia  - will reassess with repeat BMP after IVF  - hold vasotec and all other nephrotoxic meds  - venegas placed for accurate Io's  - pending CK      Other  Impaired functional mobility, balance, gait, and endurance  - Hx of multiple falls  - pending head CT  - holding eliquis and aspirin prior to head CT if patient has ICH  - no acute changes in mental status or balance in last few weeks        Critical Care Daily Checklist:    A: Awake: RASS Goal/Actual Goal:    Actual:     B: Spontaneous Breathing Trial Performed?  Not intubated   C: SAT & SBT Coordinated?  N/A                      D: Delirium: CAM-ICU     E: Early Mobility Performed? Yes   F: Feeding Goal:    Status:     Current Diet Order   Procedures    Diet NPO      AS: Analgesia/Sedation Tylenol prn   T: Thromboembolic Prophylaxis On eliquis, held until after head CT performed   H: HOB > 300 Yes   U: Stress Ulcer Prophylaxis (if needed) pepcid   G: Glucose Control Hypoglycemia protocol   B: Bowel Function     I: Indwelling Catheter (Lines & Venegas) Necessity PIV   D: De-escalation of Antimicrobials/Pharmacotherapies No abx    Plan for the day/ETD Treat TIMOTEO, IVF and reassess, trend troponin, head CT, IO's    Code Status:  Family/Goals of Care: DNR         Critical secondary to Patient has a condition that poses threat to life and bodily function: Hypotension and failure to thrive     Critical care was time spent personally by me on the following  activities: development of treatment plan with patient or surrogate and bedside caregivers, discussions with consultants, evaluation of patient's response to treatment, examination of patient, ordering and performing treatments and interventions, ordering and review of laboratory studies, ordering and review of radiographic studies, pulse oximetry, re-evaluation of patient's condition. This critical care time did not overlap with that of any other provider or involve time for any procedures.     Abdiaziz Hurst MD  Critical Care Medicine  [facility]

## 2020-09-18 NOTE — ED NOTES
Assumed care for this patient, patient in icu bed, patient has bear hugger on at this time, continuous cardiac monitoring, will continue to monitor

## 2020-09-18 NOTE — CONSULTS
Patient evaluated by and admitted to Critical Care Medicine. Full H&P to follow.    Michela Alejandre NP  Critical Care Medicine

## 2020-09-18 NOTE — ED NOTES
RN called critical care regarding pt's low BP (80s over 50s).  RN spoke with Dr Hurst, order for 500ml NS bolus.  RN also made Dr Hurst aware that pt failed swallow study.  Dr Hurst stated that he will be down to see pt.

## 2020-09-18 NOTE — HPI
79 y/o F with hx of CHF systolic and diastolic dysfunction (TTE 6/2018 LVEF 20-25%, Mitral and tricuspid valve regurgitation), CAD, HTN, Dementia, who presents to the ED with approximately 6 weeks of progressive decline in functional status. History is obtained from daughter who is primary caretaker and noticed that she has had decreased mental status, decreased PO intake with difficulty swallowing, multiple falls, decreased speech and abdominal swelling. She does not report any head trauma or acute changes in mental status. Was started on eliquis and metoprolol in July but no other medication changes. Patient's physical exam notable for sporadic speech, appears chronically ill, mucous membrane erythema in lips but no other rashes, minimal bilateral peripheral edema, irregular HR, tachypnic    In the ED patient's workup is notable for HR irregular at a rate of 100-130, relative hypotension, hyperkalemia shifted in ED without significant EKG changes, Bicarb 14, AG 12, Creatinine 2.9, BNP 2656, Troponin 0.042, & Mg 3.2, lactic 2.1 ABG - pH 7.28, pCO2 17. CBC, Phosphorus, Covid, unremarkable. CXR shows pulmonary edema and cardiomegaly

## 2020-09-19 PROBLEM — R57.0 CARDIOGENIC SHOCK: Status: ACTIVE | Noted: 2020-01-01

## 2020-09-19 PROBLEM — Z51.5 COMFORT MEASURES ONLY STATUS: Status: ACTIVE | Noted: 2020-01-01

## 2020-09-19 NOTE — PLAN OF CARE
CMICU DAILY GOALS   Hypotensive with Afib RVR overnight requiring multiple doses of metoprolol and increase in vasopressor support. Remains non-verbal does not follow commands. No further hypothermia, though warming blanket remains on.    A: Awake    RASS: Goal -    Actual -     Restraint necessity:    B: Breath   SBT: Not intubated   C: Coordinate A & B, analgesics/sedatives   Pain: managed    SAT: Not intubated  D: Delirium   CAM-ICU: Overall CAM-ICU: Positive  E: Early(intubated/ Progressive (non-intubated) Mobility   MOVE Screen: Fail   Activity: Activity Management: patient unable to perform activities  FAS: Feeding/Nutrition   Diet order: Diet/Nutrition Received: NPO,   Fluid restriction:    T: Thrombus   DVT prophylaxis: VTE Required Core Measure: (SCDs) Sequential compression device initiated/maintained  H: HOB Elevation   Head of Bed (HOB): HOB at 30-45 degrees  U: Ulcer Prophylaxis   GI: yes  G: Glucose control   managed    S: Skin   Bundle compliance: yes   Bathing/Skin Care: bath, chlorhexidine, dressed/undressed, linen changed, incontinence care Date: [unfilled]  B: Bowel Function   no issues   I: Indwelling Catheters   Carr necessity:      Urethral Catheter 09/17/20 2208 16 Fr.-Reason for Continuing Urinary Catheterization: Critically ill in ICU requiring intensive monitoring   CVC necessity: Yes   IPAD offered: No  D: De-escalation Antibx   No ABX  Plan for the day   Titrate gtts, monitor vitals and intake and output.    Family/Goals of care/Code Status   Code Status: DNR     No acute events throughout day, VS and assessment per flow sheet, patient progressing towards goals as tolerated, plan of care reviewed with Cecelia Currie and family, all concerns addressed, will continue to monitor.

## 2020-09-19 NOTE — PROCEDURES
"Cecelia Currie is a 80 y.o. female patient.    Temp: 97.5 °F (36.4 °C) (20 1630)  Pulse: (!) 118 (20)  Resp: 17 (20)  BP: 91/63 (20)  SpO2: 98 % (20)  Weight: 88.1 kg (194 lb 3.6 oz) (20 1300)  Height: 5' (152.4 cm) (20 1804)       Arterial Line    Date/Time: 2020 11:14 PM  Location procedure was performed: [facility] CARDIAC MEDICAL ICU (CMICU)  Performed by: Michela Alejandre NP  Authorized by: Michela Alejandre NP   Pre-op Diagnosis: septic shock and acute renal failure  Post-operative diagnosis: septic shock and acute renal failure  Consent Done: Yes  Consent: Verbal consent obtained.  Risks and benefits: risks, benefits and alternatives were discussed  Consent given by: adult child.  Patient understanding: patient states understanding of the procedure being performed  Patient consent: the patient's understanding of the procedure matches consent given  Procedure consent: procedure consent matches procedure scheduled  Relevant documents: relevant documents present and verified  Test results: test results available and properly labeled  Site marked: the operative site was marked  Imaging studies: imaging studies available  Required items: required blood products, implants, devices, and special equipment available  Patient identity confirmed: , MRN, name and provided demographic data  Time out: Immediately prior to procedure a "time out" was called to verify the correct patient, procedure, equipment, support staff and site/side marked as required.  Preparation: Patient was prepped and draped in the usual sterile fashion.  Indications: multiple ABGs and hemodynamic monitoring  Location: left radial  Patient sedated: no  Luigi's test normal: yes  Needle gauge: 20  Seldinger technique: Seldinger technique used  Number of attempts: 1  Complications: No  Estimated blood loss (mL): 0.5  Specimens: No  Implants: No  Post-procedure: line sutured and dressing " applied  Post-procedure CMS: normal  Patient tolerance: Patient tolerated the procedure well with no immediate complications          Michela Alejandre  9/18/2020

## 2020-09-19 NOTE — HOSPITAL COURSE
Admitted for septic shock. Started on pressor support. Patient not oriented. Getting Antibiotics and pressors. DNR/DNI but family wants aggressive care.  9/19 morning patient became very hypotensive on max pressors suddenly and bradycardic. Family arrived at bedside and decided to initiate comfort care measures.   Patient visibly comfortably passed away at 12.03 pm

## 2020-09-19 NOTE — PROCEDURES
Cecelia Currie is a 80 y.o. female patient.    Temp: 97.5 °F (36.4 °C) (09/18/20 1630)  Pulse: (!) 118 (09/18/20 1900)  Resp: 17 (09/18/20 1900)  BP: 91/63 (09/18/20 1900)  SpO2: 98 % (09/18/20 1900)  Weight: 88.1 kg (194 lb 3.6 oz) (09/18/20 1300)  Height: 5' (152.4 cm) (09/17/20 1804)       Central Line    Date/Time: 9/18/2020 11:04 PM  Performed by: Michela Alejandre NP  Authorized by: Michela Alejandre NP     Location procedure was performed:  [facility] CARDIAC MEDICAL ICU (CMICU)  Pre-operative diagnosis:  Septic shock and acute renal failure  Post-operative diagnosis:  Septic shock and acute renal failure  Consent Done ?:  Yes  Time out complete?: Verified correct patient, procedure, equipment, staff, and site/side    Indications:  Vascular access and med administration  Anesthesia:  Local infiltration  Local anesthetic:  Lidocaine 1% without epinephrine  Anesthetic total (ml):  3  Preparation:  Skin prepped with ChloraPrep  Skin prep agent dried: Skin prep agent completely dried prior to procedure    Sterile barriers: All five maximal sterile barriers used - gloves, gown, cap, mask and large sterile sheet    Hand hygiene: Hand hygiene performed immediately prior to central venous catheter insertion    Location:  Right internal jugular  Catheter type:  Trialysis  Catheter size:  12 Fr  Inserted Catheter Length (cm):  16  Ultrasound guidance: Yes    Vessel Caliber:  Large   patent  Comprressibility:  Normal  Doppler:  Not done  Needle advanced into vessel with real time ultrasound guidance.    Guidewire confirmed in vessel.    Steril sheath on probe.    Manometry: Yes    Number of attempts:  3  Securement:  Line sutured, chlorhexidine patch, sterile dressing applied and blood return through all ports  Technical Procedures Used:  Micro puncture kit  Complications: No    Estimated blood loss (mL):  30  Specimens: No    Implants: No    XRay:  Placement verified by x-ray, no pneumothorax on x-ray and successful  placement  Adverse Events:  Local hematoma  Other Complications:  Two EJ vessels overly most of IJ through course in neck. Initial attempt more cephalad. Unable to dilate through musculature. Subsequent attempts made more caudal and successful.    Two EJ vessels overly most of IJ through course in neck. Initial attempt more cephalad. Unable to dilate through musculature. Subsequent attempts made more caudal and successful.         Michela Alejandre  9/18/2020

## 2020-09-19 NOTE — EICU
Intervention Initiated From:  Bedside    Sarina Communicated with Bedside Nurse regarding:  Time-Out    Comments: Yumiko received for timeout for right femoral trialysis cath placement.  Completed & documented.  Placement attempted x3, unsuccessful.  Aborted procedure for now.  No further needs or requests for eICU at this time.  eICU time in room [id number].

## 2020-09-19 NOTE — PLAN OF CARE
POC reviewed with pt and family at 1700. Questions and concerns addressed. No acute events today. Pt progressing toward goals.    Pt displays no distress or signs of pain. Pt was hypothermic on arrival, Sharri zavaleta on upon arrival, continues. Pt on levophed titrating to maintain MAP>65. Pt continues in A fib w/ RVR.  Pt continues bicarb gtt @ 75. D50W and insulin given per MD for K+ 6.4. Plan for trialysis catheter placement.     Will continue to monitor. See flowsheets for full assessment and VS info.

## 2020-09-19 NOTE — PROGRESS NOTES
Therapy with vancomycin complete and/or consult discontinued by provider.  Pharmacy will sign off, please re-consult as needed.    Dominic Devine, PharmD  [phone]

## 2020-09-19 NOTE — SIGNIFICANT EVENT
Critical Care Medicine                                                              Death Note      Called to bedside by patient's nurse. Nursing supervisor notified. Family at bedside.  has been called and is also at bedside.  Patient is not responding to verbal or tactile stimuli. Patient does not have a papillary or corneal reflex. Her pupils are fixed and dilated. No heart or breath sounds on auscultation. No respirations. No palpable pulses.     Time of death 12:03.      Cause of Death: Mixed septic and cardiogenic shock, acute renal failure    Jean Claude Shepherd MD  Internal Medicine  [facility]  Pager: [phone]

## 2020-09-19 NOTE — NURSING
Pt asystole on continuous cardiac monitor. MD called to bedside. Per Jean Claude Shepherd MD, time of death pronounced at 1203. Pt's daughter, Prisca, present at bedside upon time of death. Cardiac strip printed and posted in pt chart.  called to bedside.

## 2020-09-19 NOTE — EICU
Intervention Initiated From:  Bedside    Sarina Communicated with Bedside Nurse regarding:  Time-Out      Comments: Yumiko received for timeout for right IJ trialysis cath placement.  Completed & documented.  Attempted x3 before success.  Good blood return & easy flush to all three ports.  BP low during procedure but stable.  Hematoma present after insertion.  CXR ordered post-insertion.  No further needs or requests for eICU at this time.  eICU time in room 3420-8200.

## 2020-09-19 NOTE — NURSING
Pt becoming increasingly more hypotensive despite uptitrating levophed gtt. HR remains in sinus rhythm but slowly dropping. MD called to bedside. Levophed gtt increased to maximum dose. Per cardiology, okay to begin dobutamine gtt. MD to call family at this time. Will continue to monitor.

## 2020-09-19 NOTE — PROGRESS NOTES
Pharmacokinetic Follow-Up Assessment: IV Vancomycin    Assessment/Plan:    - Vanc random this AM (~21 hrs post-dose) is 22.2 mcg/mL  - Continue dosing by level in the setting of TIMOTEO  - Hold vanc today- no doses indicated  - Draw random level tomorrow 9/20 with AM labs  - Will re-dose based on level and renal function    Pharmacy will continue to follow and monitor vancomycin. Please contact pharmacy at extension [phone] with any questions regarding this assessment.     Thank you for the consult,   Shanta Zazueta, PharmD, [facility]  Neurocritical Care Pharmacist  [phone]       Patient brief summary:  Cecelia Currie is a 80 y.o. female initiated on antimicrobial therapy with IV Vancomycin for treatment of suspected bacteremia    Drug Allergies:   Review of patient's allergies indicates:  No Known Allergies    Actual Body Weight:   81.6 kg    Renal Function:   Estimated Creatinine Clearance: 15.3 mL/min (A) (based on SCr of 2.8 mg/dL (H)).,     Dialysis Method (if applicable):  N/A    CBC (last 72 hours):  Recent Labs   Lab Result Units 09/17/20 1834 09/18/20 0320 09/18/20 1815 09/19/20  0117   WBC K/uL 7.06 6.75 6.67 8.03   Hemoglobin g/dL 16.3* 18.1* 14.1 14.7   Hematocrit % 51.7* 61.8* 43.7 44.3   Platelets K/uL 286 259 264 288   Gran% % 59.9 65.7 80.1* 71.0   Lymph% % 28.0 22.1 14.1* 15.2*   Mono% % 9.3 10.7 4.3 11.3   Eosinophil% % 1.8 0.4 0.9 1.6   Basophil% % 0.6 0.7 0.3 0.7   Differential Method  Automated Automated Automated Automated       Metabolic Panel (last 72 hours):  Recent Labs   Lab Result Units 09/17/20  1834 09/17/20  2207 09/18/20  0110 09/18/20  0320 09/18/20  0504 09/18/20  0822 09/18/20  1401 09/18/20  1815 09/18/20  2103 09/19/20  0117 09/19/20  0854   Sodium mmol/L 140  --  143 140  --  134* 137 138 140 139  139 138   Sodium Urine Random mmol/L  --   --   --   --  <20*  --   --   --   --   --   --    Potassium mmol/L 6.6*  --  4.4 6.7*  --  5.3* 6.4* 5.6* 4.6  4.6 5.3*  5.3*  5.3*  5.3*  6.2*  6.2*   Potassium Urine Random mmol/L  --   --   --   --  40  --   --   --   --   --   --    Chloride mmol/L 114*  --  124* 120*  --  111* 111* 112* 105 111*  111* 106   Chloride, Rand Ur mmol/L  --   --   --   --  24*  --   --   --   --   --   --    CO2 mmol/L 14*  --  8* <5*  --  10* 12* 13* 23 13*  13* 13*   Glucose mg/dL 100  --  74 67*  --  238* 109 248* 314* 124*  124* 208*   Glucose, UA   --  Negative  --   --   --   --   --   --   --   --   --    BUN, Bld mg/dL 119*  --  105* 121*  --  115* 108* 117* 111* 112*  112* 109*   Creatinine mg/dL 2.9*  --  2.2* 2.8*  --  2.6* 2.5* 2.6* 2.5* 2.5*  2.5* 2.8*   Creatinine, Random Ur mg/dL  --   --   --   --  183.0  --   --   --   --   --   --    Albumin g/dL 3.4*  --   --   --   --   --   --  2.6*  --   --   --    Total Bilirubin mg/dL 1.9*  --   --   --   --   --   --  1.6*  --   --   --    Alkaline Phosphatase U/L 131  --   --   --   --   --   --  83  --   --   --    AST U/L 47*  --   --   --   --   --   --  31  --   --   --    ALT U/L 23  --   --   --   --   --   --  18  --   --   --    Magnesium mg/dL 3.2*  --   --  3.0*  --   --  2.7* 2.3  --  2.3  --    Phosphorus mg/dL 4.4  --   --  4.5  --   --  4.2  --   --  3.2  --        Drug levels (last 3 results):  Recent Labs   Lab Result Units 09/19/20  0117   Vancomycin, Random ug/mL 22.2       Microbiologic Results:  Microbiology Results (last 7 days)     Procedure Component Value Units Date/Time    Blood culture [[id number]] Collected: 09/19/20 0919    Order Status: Sent Specimen: Blood from Peripheral, Lower Arm, Left Updated: 09/19/20 0929    Blood culture [[id number]] Collected: 09/19/20 0610    Order Status: Sent Specimen: Blood from Peripheral, Antecubital, Right Updated: 09/19/20 0630    Blood culture [[id number]] Collected: 09/18/20 0329    Order Status: Completed Specimen: Blood from Peripheral, Hand, Left Updated: 09/19/20 0613     Blood Culture, Routine No Growth to date      No Growth to date     Blood culture [[id number]] Collected: 09/18/20 0328    Order Status: Completed Specimen: Blood from Peripheral, Wrist, Right Updated: 09/19/20 0613     Blood Culture, Routine No Growth to date      No Growth to date

## 2020-09-20 NOTE — SUBJECTIVE & OBJECTIVE
Interval History/Significant Events: NAEON    Review of Systems   Unable to perform ROS: Dementia     Objective:     Vital Signs (Most Recent):  Temp: 97.9 °F (36.6 °C) (09/19/20 0700)  Pulse: (!) 0 (09/19/20 1203)  Resp: (!) 0 (09/19/20 1203)  BP: (!) 0/0 (09/19/20 1203)  SpO2: 95 % (09/19/20 1010) Vital Signs (24h Range):  Temp:  [97.9 °F (36.6 °C)-99.2 °F (37.3 °C)] 97.9 °F (36.6 °C)  Pulse:  [0-133] 0  Resp:  [0-35] 0  SpO2:  [92 %-99 %] 95 %  BP: (0-105)/(0-88) 0/0  Arterial Line BP: ()/(19-73) 23/23   Weight: 88.1 kg (194 lb 3.6 oz)  Body mass index is 37.93 kg/m².      Intake/Output Summary (Last 24 hours) at 9/19/2020 2110  Last data filed at 9/19/2020 1200  Gross per 24 hour   Intake 1660.66 ml   Output 918 ml   Net 742.66 ml       Physical Exam  Vitals signs reviewed.   Constitutional:       Appearance: She is ill-appearing.   HENT:      Head: Atraumatic.      Right Ear: External ear normal.      Left Ear: External ear normal.      Nose: Nose normal.      Mouth/Throat:      Pharynx: Posterior oropharyngeal erythema (mucosal erythema) present.   Eyes:      Extraocular Movements: Extraocular movements intact.      Pupils: Pupils are equal, round, and reactive to light.   Neck:      Musculoskeletal: Normal range of motion and neck supple. No muscular tenderness.   Cardiovascular:      Rate and Rhythm: Tachycardia present. Rhythm irregular.      Pulses: Normal pulses.      Heart sounds: Normal heart sounds.   Pulmonary:      Breath sounds: No rhonchi or rales.      Comments: tachypnea  Abdominal:      Tenderness: There is no abdominal tenderness. There is no guarding or rebound.   Musculoskeletal: Normal range of motion.         General: No tenderness.      Right lower leg: No edema.      Left lower leg: No edema.   Skin:     Capillary Refill: Capillary refill takes less than 2 seconds.      Coloration: Skin is not jaundiced or pale.   Neurological:      General: No focal deficit present.      Mental  Status: She is alert and oriented to person, place, and time.         Vents:  Oxygen Concentration (%): 44 (09/18/20 0543)  Lines/Drains/Airways     Central Venous Catheter Line                 Hemodialysis Catheter 09/18/20 2210 right internal jugular less than 1 day          Drain                 Urethral Catheter 09/17/20 2208 16 Fr. 1 day         NG/OG Tube 09/19/20 1030 14 Fr. Right nostril less than 1 day          Arterial Line            Arterial Line 09/18/20 2255 Left Radial less than 1 day          Peripheral Intravenous Line                 Peripheral IV - Single Lumen 09/18/20 0641 24 G Right Upper Arm 1 day              Significant Labs:    CBC/Anemia Profile:  Recent Labs   Lab 09/18/20  0110 09/18/20  0320 09/18/20  1815 09/19/20  0117   WBC  --  6.75 6.67 8.03   HGB  --  18.1* 14.1 14.7   HCT  --  61.8* 43.7 44.3   PLT  --  259 264 288   MCV  --  96 87 85   RDW  --  19.3* 17.7* 17.4*   [id number] >2000*  --   --   --         Chemistries:  Recent Labs   Lab 09/18/20  0320  09/18/20  1401 09/18/20  1815 09/18/20  2103 09/19/20  0117 09/19/20  0854      < > 137 138 140 139  139 138   K 6.7*   < > 6.4* 5.6* 4.6  4.6 5.3*  5.3*  5.3*  5.3* 6.2*  6.2*   *   < > 111* 112* 105 111*  111* 106   CO2 <5*   < > 12* 13* 23 13*  13* 13*   *   < > 108* 117* 111* 112*  112* 109*   CREATININE 2.8*   < > 2.5* 2.6* 2.5* 2.5*  2.5* 2.8*   CALCIUM 9.4   < > 9.1 8.1* 8.2* 8.5*  8.5* 8.4*   ALBUMIN  --   --   --  2.6*  --   --   --    PROT  --   --   --  6.4  --   --   --    BILITOT  --   --   --  1.6*  --   --   --    ALKPHOS  --   --   --  83  --   --   --    ALT  --   --   --  18  --   --   --    AST  --   --   --  31  --   --   --    MG 3.0*  --  2.7* 2.3  --  2.3  --    PHOS 4.5  --  4.2  --   --  3.2  --     < > = values in this interval not displayed.       All pertinent labs within the past 24 hours have been reviewed.    Significant Imaging:  I have reviewed all pertinent imaging  results/findings within the past 24 hours.

## 2020-09-20 NOTE — PROGRESS NOTES
[facility]  Critical Care Medicine  Progress Note    Patient Name: Cecelia Currie  MRN: [id number]  Admission Date: 9/17/2020  Hospital Length of Stay: 2 days  Code Status: Prior  Attending Provider: No att. providers found  Primary Care Provider: Tosin Wharton MD   Principal Problem: Biventricular heart failure    Subjective:     HPI:  81 y/o F with hx of CHF systolic and diastolic dysfunction (TTE 6/2018 LVEF 20-25%, Mitral and tricuspid valve regurgitation), CAD, HTN, Dementia, who presents to the ED with approximately 6 weeks of progressive decline in functional status. History is obtained from daughter who is primary caretaker and noticed that she has had decreased mental status, decreased PO intake with difficulty swallowing, multiple falls, decreased speech and abdominal swelling. She does not report any head trauma or acute changes in mental status. Was started on eliquis and metoprolol in July but no other medication changes. Patient's physical exam notable for sporadic speech, appears chronically ill, mucous membrane erythema in lips but no other rashes, minimal bilateral peripheral edema, irregular HR, tachypnic    In the ED patient's workup is notable for HR irregular at a rate of 100-130, relative hypotension, hyperkalemia shifted in ED without significant EKG changes, Bicarb 14, AG 12, Creatinine 2.9, BNP 2656, Troponin 0.042, & Mg 3.2, lactic 2.1 ABG - pH 7.28, pCO2 17. CBC, Phosphorus, Covid, unremarkable. CXR shows pulmonary edema and cardiomegaly    Hospital/ICU Course:  Admitted for septic shock. Started on pressor support. Patient not oriented. Getting Antibiotics and pressors. DNR/DNI but family wants aggressive care    Interval History/Significant Events: NAEON    Review of Systems   Unable to perform ROS: Dementia     Objective:     Vital Signs (Most Recent):  Temp: 97.9 °F (36.6 °C) (09/19/20 0700)  Pulse: (!) 0 (09/19/20 1203)  Resp: (!) 0 (09/19/20 1203)  BP:  (!) 0/0 (09/19/20 1203)  SpO2: 95 % (09/19/20 1010) Vital Signs (24h Range):  Temp:  [97.9 °F (36.6 °C)-99.2 °F (37.3 °C)] 97.9 °F (36.6 °C)  Pulse:  [0-133] 0  Resp:  [0-35] 0  SpO2:  [92 %-99 %] 95 %  BP: (0-105)/(0-88) 0/0  Arterial Line BP: ()/(19-73) 23/23   Weight: 88.1 kg (194 lb 3.6 oz)  Body mass index is 37.93 kg/m².      Intake/Output Summary (Last 24 hours) at 9/19/2020 2110  Last data filed at 9/19/2020 1200  Gross per 24 hour   Intake 1660.66 ml   Output 918 ml   Net 742.66 ml       Physical Exam  Vitals signs reviewed.   Constitutional:       Appearance: She is ill-appearing.   HENT:      Head: Atraumatic.      Right Ear: External ear normal.      Left Ear: External ear normal.      Nose: Nose normal.      Mouth/Throat:      Pharynx: Posterior oropharyngeal erythema (mucosal erythema) present.   Eyes:      Extraocular Movements: Extraocular movements intact.      Pupils: Pupils are equal, round, and reactive to light.   Neck:      Musculoskeletal: Normal range of motion and neck supple. No muscular tenderness.   Cardiovascular:      Rate and Rhythm: Tachycardia present. Rhythm irregular.      Pulses: Normal pulses.      Heart sounds: Normal heart sounds.   Pulmonary:      Breath sounds: No rhonchi or rales.      Comments: tachypnea  Abdominal:      Tenderness: There is no abdominal tenderness. There is no guarding or rebound.   Musculoskeletal: Normal range of motion.         General: No tenderness.      Right lower leg: No edema.      Left lower leg: No edema.   Skin:     Capillary Refill: Capillary refill takes less than 2 seconds.      Coloration: Skin is not jaundiced or pale.   Neurological:      General: No focal deficit present.      Mental Status: She is alert and oriented to person, place, and time.         Vents:  Oxygen Concentration (%): 44 (09/18/20 0543)  Lines/Drains/Airways     Central Venous Catheter Line                 Hemodialysis Catheter 09/18/20 2210 right internal jugular  less than 1 day          Drain                 Urethral Catheter 09/17/20 2208 16 Fr. 1 day         NG/OG Tube 09/19/20 1030 14 Fr. Right nostril less than 1 day          Arterial Line            Arterial Line 09/18/20 2255 Left Radial less than 1 day          Peripheral Intravenous Line                 Peripheral IV - Single Lumen 09/18/20 0641 24 G Right Upper Arm 1 day              Significant Labs:    CBC/Anemia Profile:  Recent Labs   Lab 09/18/20  0110 09/18/20  0320 09/18/20  1815 09/19/20  0117   WBC  --  6.75 6.67 8.03   HGB  --  18.1* 14.1 14.7   HCT  --  61.8* 43.7 44.3   PLT  --  259 264 288   MCV  --  96 87 85   RDW  --  19.3* 17.7* 17.4*   [id number] >2000*  --   --   --         Chemistries:  Recent Labs   Lab 09/18/20  0320  09/18/20  1401 09/18/20  1815 09/18/20  2103 09/19/20  0117 09/19/20  0854      < > 137 138 140 139  139 138   K 6.7*   < > 6.4* 5.6* 4.6  4.6 5.3*  5.3*  5.3*  5.3* 6.2*  6.2*   *   < > 111* 112* 105 111*  111* 106   CO2 <5*   < > 12* 13* 23 13*  13* 13*   *   < > 108* 117* 111* 112*  112* 109*   CREATININE 2.8*   < > 2.5* 2.6* 2.5* 2.5*  2.5* 2.8*   CALCIUM 9.4   < > 9.1 8.1* 8.2* 8.5*  8.5* 8.4*   ALBUMIN  --   --   --  2.6*  --   --   --    PROT  --   --   --  6.4  --   --   --    BILITOT  --   --   --  1.6*  --   --   --    ALKPHOS  --   --   --  83  --   --   --    ALT  --   --   --  18  --   --   --    AST  --   --   --  31  --   --   --    MG 3.0*  --  2.7* 2.3  --  2.3  --    PHOS 4.5  --  4.2  --   --  3.2  --     < > = values in this interval not displayed.       All pertinent labs within the past 24 hours have been reviewed.    Significant Imaging:  I have reviewed all pertinent imaging results/findings within the past 24 hours.      [facility]  Recent Labs   Lab 09/19/20  1010   PH 7.318*   PO2 19*   PCO2 29.4*   HCO3 15.1*   BE -11     Assessment/Plan:     Cardiac/Vascular  * Biventricular heart failure  - TTE 6/2018 EF 20--25%  - BNP 2656,  3000 on previous admission 1 year ago  - CXR shows cardiomegaly and vascular congestion  - appears intravascularly volume down, will give fluids in small aliquots and reassess  - Continuing home metoprolol and aspirin    NSTEMI (non-ST elevated myocardial infarction)  Troponin 0.042   - repeat trop in 4 hours    Atrial fibrillation  - irregular rate 100-130's  - will start home metoprolol  - not in afib RVR, rate likely compensatory to intravascular hypovolemia    CAD (coronary artery disease)  - on aspirin 82  - troponin 0.042, repeat trop in 4 hours    Essential hypertension  Held vasotec and lasix in setting of TIMOTEO    Renal/  Uremia  Bun 119  - Creatinine 2.9  - will continue to trend with BMP    Hyperkalemia  K on admission 6.6  - EKG showed no significant changes consistent with hyperkalemia  - Given calcium, insulin/d50, albuterol, will repeat BMP      TIMOTEO (acute kidney injury)  - Creatinine 2.9  - K+ 6.6, repeat BMP s/p shifting, Bicarb 14, pH 7.28\  - Likely 2/2 cardiorenal vs intravascular hypovolemia  - will reassess with repeat BMP after IVF  - hold vasotec and all other nephrotoxic meds  - venegas placed for accurate IO's      Other  Impaired functional mobility, balance, gait, and endurance  - Hx of multiple falls  - pending head CT  - holding eliquis and aspirin prior to head CT if patient has ICH  - no acute changes in mental status or balance in last few weeks       Critical Care Daily Checklist:    A: Awake: RASS Goal/Actual Goal:    Actual: Singh Agitation Sedation Scale (RASS): Drowsy   B: Spontaneous Breathing Trial Performed?     C: SAT & SBT Coordinated?  NA                   D: Delirium: CAM-ICU Overall CAM-ICU: Positive   E: Early Mobility Performed? No   F: Feeding Goal:    Status:     Current Diet Order   No orders of the defined types were placed in this encounter.      AS: Analgesia/Sedation NA   T: Thromboembolic Prophylaxis Yes   H: HOB > 300 Yes   U: Stress Ulcer Prophylaxis (if  needed) NA   G: Glucose Control Yes   B: Bowel Function Stool Occurrence: 0   I: Indwelling Catheter (Lines & Carr) Necessity Yes   D: De-escalation of Antimicrobials/Pharmacotherapies No    Plan for the day/ETD     Code Status:  Family/Goals of Care: DNR/DNI         Critical secondary to Patient has a condition that poses threat to life and bodily function: Septic shock      Critical care was time spent personally by me on the following activities: development of treatment plan with patient or surrogate and bedside caregivers, discussions with consultants, evaluation of patient's response to treatment, examination of patient, ordering and performing treatments and interventions, ordering and review of laboratory studies, ordering and review of radiographic studies, pulse oximetry, re-evaluation of patient's condition. This critical care time did not overlap with that of any other provider or involve time for any procedures.     Jefferson Martinez MD  Critical Care Medicine  [facility]

## 2020-09-20 NOTE — DISCHARGE SUMMARY
[facility]  Critical Care Medicine  Discharge Summary      Patient Name: Cecelia Currie  MRN: [id number]  Admission Date: 9/17/2020  Hospital Length of Stay: 2 days  Discharge Date and Time: 9/19/2020     Attending Physician: No att. providers found   Discharging Provider: Jefferson Martinez MD  Primary Care Provider: Tosin Wharton MD  Reason for Admission: Septic shock and cardiogenic shock    HPI:   81 y/o F with hx of CHF systolic and diastolic dysfunction (TTE 6/2018 LVEF 20-25%, Mitral and tricuspid valve regurgitation), CAD, HTN, Dementia, who presents to the ED with approximately 6 weeks of progressive decline in functional status. History is obtained from daughter who is primary caretaker and noticed that she has had decreased mental status, decreased PO intake with difficulty swallowing, multiple falls, decreased speech and abdominal swelling. She does not report any head trauma or acute changes in mental status. Was started on eliquis and metoprolol in July but no other medication changes. Patient's physical exam notable for sporadic speech, appears chronically ill, mucous membrane erythema in lips but no other rashes, minimal bilateral peripheral edema, irregular HR, tachypnic    In the ED patient's workup is notable for HR irregular at a rate of 100-130, relative hypotension, hyperkalemia shifted in ED without significant EKG changes, Bicarb 14, AG 12, Creatinine 2.9, BNP 2656, Troponin 0.042, & Mg 3.2, lactic 2.1 ABG - pH 7.28, pCO2 17. CBC, Phosphorus, Covid, unremarkable. CXR shows pulmonary edema and cardiomegaly    * No surgery found *    Indwelling Lines/Drains at Time of Discharge:   Lines/Drains/Airways     Central Venous Catheter Line                 Hemodialysis Catheter 09/18/20 2210 right internal jugular less than 1 day          Drain                 Urethral Catheter 09/17/20 2208 16 Fr. 1 day         NG/OG Tube 09/19/20 1030 14 Fr. Right nostril less than  1 day          Arterial Line            Arterial Line 20 2255 Left Radial less than 1 day              Hospital Course:   Admitted for septic shock. Started on pressor support. Patient not oriented. Getting Antibiotics and pressors. DNR/DNI but family wants aggressive care.   morning patient became very hypotensive on max pressors suddenly and bradycardic. Family arrived at bedside and decided to initiate comfort care measures.   Patient visibly comfortably passed away at 12.03 pm    Physical Exam   Constitutional:   Patient was pronounced dead at 12.03 pm today   Nursing note and vitals reviewed.    Consults (From admission, onward)        Status Ordering Provider     Inpatient consult to Critical Care Medicine  Once     Provider:  (Not yet assigned)    Completed ASH WEEKS        Significant Labs:  None    Significant Imaging:  I have reviewed all pertinent imaging results/findings within the past 24 hours.    Pending Diagnostic Studies:     None        Final Active Diagnoses:    Diagnosis Date Noted POA    PRINCIPAL PROBLEM:  Biventricular heart failure [I50.82]  Yes    Comfort measures only status [Z51.5] 2020 Not Applicable    Cardiogenic shock [R57.0] 2020 Yes    Impaired functional mobility, balance, gait, and endurance [Z74.09] 2020 Yes    Atrial fibrillation [I48.91] 2020 Unknown    Hyperkalemia [E87.5] 2020 Unknown    NSTEMI (non-ST elevated myocardial infarction) [I21.4] 2020 Unknown    Uremia [N19] 2020 Yes    TIMOTEO (acute kidney injury) [N17.9] 2019 Yes    CAD (coronary artery disease) [I25.10] 2019 Yes    Essential hypertension [I10] 2018 Yes      Problems Resolved During this Admission:     No new Assessment & Plan notes have been filed under this hospital service since the last note was generated.  Service: Critical Care Medicine    Discharged Condition:     Disposition:       Patient Instructions:   No  discharge procedures on file.  Medications:  None (patient  at medical facility)     Jefferson Martinez MD  Critical Care Medicine  [facility]

## 2020-09-21 ENCOUNTER — TELEPHONE (OUTPATIENT)
Dept: PRIMARY CARE CLINIC | Facility: CLINIC | Age: 80
End: 2020-09-21

## 2020-09-21 NOTE — TELEPHONE ENCOUNTER
----- Message from Tonya Mar sent at 9/21/2020  8:16 AM CDT -----  Contact: Avtar mccarty/ [employer]   [facility] is requesting nurse to call to cancel order for bed. Please advise

## 2020-09-21 NOTE — TELEPHONE ENCOUNTER
----- Message from Tonya Mar sent at 9/21/2020  8:16 AM CDT -----  Contact: Avtar mccarty/ [employer]   [facility] is requesting nurse to call to cancel order for bed. Please advise

## 2020-09-22 LAB
BACTERIA BLD CULT: ABNORMAL

## 2020-09-23 LAB — BACTERIA BLD CULT: NORMAL

## 2020-09-23 NOTE — PHYSICIAN QUERY
"PT Name: Cecelia Currie  MR #: [id number]    Physician Query Form - Heart  Condition Clarification     CDS/: Cele Sotomayor RN, CCDS             Contact information: [email or web address]  This form is a permanent document in the medical record.     Query Date: September 23, 2020    By submitting this query, we are merely seeking further clarification of documentation. Please utilize your independent clinical judgment when addressing the question(s) below.    The medical record contains the following   Indicators     Supporting Clinical Findings Location in Medical Record   X BNP 2,656-->2,444 9/17-9/18 lab   X EF (TTE 6/2018 LVEF 20-25%)   9/17 h/p   X Radiology findings Cardiomegaly with small bilateral pleural effusions and pulmonary vascular congestion, suggestive of underlying pulmonary edema secondary to CHF.   9/17 cxr   X Echo Results (TTE 6/2018 LVEF 20-25%, Mitral and tricuspid valve regurgitation) 9/17 h/p    "Ascites" documented      "SOB" or "MAKI" documented     X "Hypoxia" documented pt hypoxic at 83-88% on RA. Pt placed on 2L NC 9/18 ed nurse note     X Heart Failure documented hx of CHF systolic and diastolic dysfunction     Biventricular heart failure 9/17 h/p    9/17 h/p, 9/18 prog note, 9/19 d/c summary    "Edema" documented     X Diuretics/Meds Furosemide 120 mg IV x1 9/18 mar    Treatment:      Other:      Heart failure (HF) can be acute, chronic or both. It is generally further specificed as systolic, diastolic, or combined. Lastly, it is important to identify an underlying etiology if known or suspected.     Common clues to acute exacerbation:  Rapidly progressive symptoms (w/in 2 weeks of presentation), using IV diuretics to treat, using supplemental O2, pulmonary edema on Xray, MI w/in 4 weeks, and/or BNP >500    Systolic Heart Failure: is defined as chart documentation of a left ventricular ejection fraction (LVEF) less than 40%     Diastolic Heart Failure: is defined as a left " ventricular ejection fraction (LVEF) greater than 40%   +      Evidence of diastolic dysfunction on echocardiography OR    Right heart catheterization wedge pressure above 12 mm Hg OR    Left heart catheterization left ventricular end diastolic pressure 18 mm Hg or above.    References: *American Heart Association    The clinical guidelines noted below are only system guidelines, and do not replace the providers clinical judgment.     Provider, please specify Acuity for systolic and diastolic CHF.  [ X  ] Acute on Chronic Combined Systolic and Diastolic Heart Failure      [   ] Chronic Combined Systolic and Diastolic Heart Failure   [   ] Other Type of Heart Failure (please specify type):   [   ] Other (please specify):   [  ] Clinically Undetermined                           Please document in your progress notes daily for the duration of treatment until resolved and include in your discharge summary.

## 2020-09-24 LAB
BACTERIA BLD CULT: NORMAL
BACTERIA BLD CULT: NORMAL

## 2021-11-08 NOTE — PROGRESS NOTES
Pharmacist Renal Dose Adjustment Note    Cecelia Currie is a 80 y.o. female being treated with the medication cefepime.    Patient Data:    Vital Signs (Most Recent):  Temp: (!) 93.8 °F (34.3 °C) (09/18/20 0701)  Pulse: 98 (09/18/20 0731)  Resp: 16 (09/18/20 0710)  BP: 98/75 (09/18/20 0708)  SpO2: 100 % (09/18/20 0731)   Vital Signs (72h Range):  Temp:  [92.8 °F (33.8 °C)-97.6 °F (36.4 °C)]   Pulse:  []   Resp:  [16-22]   BP: ()/(30-77)   SpO2:  [53 %-100 %]      Recent Labs   Lab 09/17/20  1834 09/18/20  0110 09/18/20  0320   CREATININE 2.9* 2.2* 2.8*     Serum creatinine: 2.8 mg/dL (H) 09/18/20 0320  Estimated creatinine clearance: 14.6 mL/min (A)    Cefepime 1 g q12h will be changed to cefepime 1 g q24h.    Pharmacist's Name: Emma Cohn  Pharmacist's Extension: [phone]     48